# Patient Record
Sex: FEMALE | Race: WHITE | Employment: FULL TIME | URBAN - METROPOLITAN AREA
[De-identification: names, ages, dates, MRNs, and addresses within clinical notes are randomized per-mention and may not be internally consistent; named-entity substitution may affect disease eponyms.]

---

## 2016-10-05 LAB
ANTIBODY SCREEN, EXTERNAL: NEGATIVE
CHLAMYDIA, EXTERNAL: NEGATIVE
HBSAG, EXTERNAL: NEGATIVE
HIV, EXTERNAL: NEGATIVE
N. GONORRHEA, EXTERNAL: NEGATIVE
RPR, EXTERNAL: NEGATIVE
RUBELLA, EXTERNAL: NORMAL
TYPE, ABO & RH, EXTERNAL: NORMAL

## 2017-04-21 LAB — GRBS, EXTERNAL: NEGATIVE

## 2017-05-12 ENCOUNTER — HOSPITAL ENCOUNTER (INPATIENT)
Age: 32
LOS: 7 days | Discharge: HOME | End: 2017-05-19
Attending: OBSTETRICS & GYNECOLOGY | Admitting: OBSTETRICS & GYNECOLOGY
Payer: COMMERCIAL

## 2017-05-12 ENCOUNTER — ANESTHESIA (OUTPATIENT)
Dept: LABOR AND DELIVERY | Age: 32
End: 2017-05-12
Payer: COMMERCIAL

## 2017-05-12 ENCOUNTER — ANESTHESIA EVENT (OUTPATIENT)
Dept: LABOR AND DELIVERY | Age: 32
End: 2017-05-12
Payer: COMMERCIAL

## 2017-05-12 PROBLEM — Z33.1 IUP (INTRAUTERINE PREGNANCY), INCIDENTAL: Status: ACTIVE | Noted: 2017-05-12

## 2017-05-12 LAB
ABO + RH BLD: NORMAL
BASOPHILS # BLD AUTO: 0 K/UL (ref 0–0.06)
BASOPHILS # BLD: 0 % (ref 0–2)
BLOOD GROUP ANTIBODIES SERPL: NORMAL
DIFFERENTIAL METHOD BLD: ABNORMAL
EOSINOPHIL # BLD: 0 K/UL (ref 0–0.4)
EOSINOPHIL NFR BLD: 0 % (ref 0–5)
ERYTHROCYTE [DISTWIDTH] IN BLOOD BY AUTOMATED COUNT: 14.2 % (ref 11.6–14.5)
HCT VFR BLD AUTO: 35.4 % (ref 35–45)
HGB BLD-MCNC: 12.1 G/DL (ref 12–16)
LYMPHOCYTES # BLD AUTO: 11 % (ref 21–52)
LYMPHOCYTES # BLD: 1.9 K/UL (ref 0.9–3.6)
MCH RBC QN AUTO: 28.7 PG (ref 24–34)
MCHC RBC AUTO-ENTMCNC: 34.2 G/DL (ref 31–37)
MCV RBC AUTO: 84.1 FL (ref 74–97)
MONOCYTES # BLD: 0.8 K/UL (ref 0.05–1.2)
MONOCYTES NFR BLD AUTO: 4 % (ref 3–10)
NEUTS SEG # BLD: 14.6 K/UL (ref 1.8–8)
NEUTS SEG NFR BLD AUTO: 85 % (ref 40–73)
PLATELET # BLD AUTO: 237 K/UL (ref 135–420)
PMV BLD AUTO: 10.9 FL (ref 9.2–11.8)
RBC # BLD AUTO: 4.21 M/UL (ref 4.2–5.3)
SPECIMEN EXP DATE BLD: NORMAL
WBC # BLD AUTO: 17.3 K/UL (ref 4.6–13.2)

## 2017-05-12 PROCEDURE — 77030034849

## 2017-05-12 PROCEDURE — 99282 EMERGENCY DEPT VISIT SF MDM: CPT

## 2017-05-12 PROCEDURE — 74011250636 HC RX REV CODE- 250/636

## 2017-05-12 PROCEDURE — 59025 FETAL NON-STRESS TEST: CPT

## 2017-05-12 PROCEDURE — 86900 BLOOD TYPING SEROLOGIC ABO: CPT | Performed by: OBSTETRICS & GYNECOLOGY

## 2017-05-12 PROCEDURE — 65270000029 HC RM PRIVATE

## 2017-05-12 PROCEDURE — 85025 COMPLETE CBC W/AUTO DIFF WBC: CPT | Performed by: OBSTETRICS & GYNECOLOGY

## 2017-05-12 PROCEDURE — 36415 COLL VENOUS BLD VENIPUNCTURE: CPT | Performed by: OBSTETRICS & GYNECOLOGY

## 2017-05-12 PROCEDURE — 74011000250 HC RX REV CODE- 250

## 2017-05-12 PROCEDURE — 77030007879 HC KT SPN EPDRL TELE -B: Performed by: ANESTHESIOLOGY

## 2017-05-12 PROCEDURE — 75410000002 HC LABOR FEE PER 1 HR

## 2017-05-12 PROCEDURE — 76060000078 HC EPIDURAL ANESTHESIA

## 2017-05-12 PROCEDURE — 74011250636 HC RX REV CODE- 250/636: Performed by: ANESTHESIOLOGY

## 2017-05-12 PROCEDURE — 74011250636 HC RX REV CODE- 250/636: Performed by: OBSTETRICS & GYNECOLOGY

## 2017-05-12 RX ORDER — OXYTOCIN/RINGER'S LACTATE 20/1000 ML
500 PLASTIC BAG, INJECTION (ML) INTRAVENOUS ONCE
Status: COMPLETED | OUTPATIENT
Start: 2017-05-12 | End: 2017-05-13

## 2017-05-12 RX ORDER — METHYLERGONOVINE MALEATE 0.2 MG/ML
0.2 INJECTION INTRAVENOUS AS NEEDED
Status: DISCONTINUED | OUTPATIENT
Start: 2017-05-12 | End: 2017-05-13

## 2017-05-12 RX ORDER — FENTANYL CITRATE 50 UG/ML
INJECTION, SOLUTION INTRAMUSCULAR; INTRAVENOUS
Status: DISCONTINUED
Start: 2017-05-12 | End: 2017-05-13

## 2017-05-12 RX ORDER — FENTANYL/ROPIVACAINE/NS/PF 2MCG/ML-.1
PLASTIC BAG, INJECTION (ML) EPIDURAL
Status: COMPLETED
Start: 2017-05-12 | End: 2017-05-12

## 2017-05-12 RX ORDER — HYDROMORPHONE HYDROCHLORIDE 1 MG/ML
1 INJECTION, SOLUTION INTRAMUSCULAR; INTRAVENOUS; SUBCUTANEOUS
Status: DISCONTINUED | OUTPATIENT
Start: 2017-05-12 | End: 2017-05-13

## 2017-05-12 RX ORDER — BUTORPHANOL TARTRATE 2 MG/ML
2 INJECTION INTRAMUSCULAR; INTRAVENOUS
Status: DISCONTINUED | OUTPATIENT
Start: 2017-05-12 | End: 2017-05-13

## 2017-05-12 RX ORDER — SODIUM CHLORIDE 0.9 % (FLUSH) 0.9 %
5-10 SYRINGE (ML) INJECTION AS NEEDED
Status: DISCONTINUED | OUTPATIENT
Start: 2017-05-12 | End: 2017-05-13

## 2017-05-12 RX ORDER — FENTANYL CITRATE 50 UG/ML
INJECTION, SOLUTION INTRAMUSCULAR; INTRAVENOUS AS NEEDED
Status: DISCONTINUED | OUTPATIENT
Start: 2017-05-12 | End: 2017-05-13 | Stop reason: HOSPADM

## 2017-05-12 RX ORDER — LIDOCAINE HYDROCHLORIDE 10 MG/ML
INJECTION INFILTRATION; PERINEURAL
Status: DISCONTINUED
Start: 2017-05-12 | End: 2017-05-13

## 2017-05-12 RX ORDER — NALOXONE HYDROCHLORIDE 0.4 MG/ML
0.2 INJECTION, SOLUTION INTRAMUSCULAR; INTRAVENOUS; SUBCUTANEOUS AS NEEDED
Status: DISCONTINUED | OUTPATIENT
Start: 2017-05-12 | End: 2017-05-13

## 2017-05-12 RX ORDER — SODIUM CHLORIDE, SODIUM LACTATE, POTASSIUM CHLORIDE, CALCIUM CHLORIDE 600; 310; 30; 20 MG/100ML; MG/100ML; MG/100ML; MG/100ML
125 INJECTION, SOLUTION INTRAVENOUS CONTINUOUS
Status: DISCONTINUED | OUTPATIENT
Start: 2017-05-12 | End: 2017-05-13

## 2017-05-12 RX ORDER — FENTANYL/ROPIVACAINE/NS/PF 2MCG/ML-.1
12 PLASTIC BAG, INJECTION (ML) EPIDURAL
Status: DISCONTINUED | OUTPATIENT
Start: 2017-05-12 | End: 2017-05-13

## 2017-05-12 RX ORDER — OXYTOCIN/RINGER'S LACTATE 20/1000 ML
125 PLASTIC BAG, INJECTION (ML) INTRAVENOUS CONTINUOUS
Status: DISCONTINUED | OUTPATIENT
Start: 2017-05-12 | End: 2017-05-13

## 2017-05-12 RX ORDER — LIDOCAINE HYDROCHLORIDE 10 MG/ML
20 INJECTION, SOLUTION EPIDURAL; INFILTRATION; INTRACAUDAL; PERINEURAL AS NEEDED
Status: DISCONTINUED | OUTPATIENT
Start: 2017-05-12 | End: 2017-05-13

## 2017-05-12 RX ORDER — SODIUM CHLORIDE 0.9 % (FLUSH) 0.9 %
5-10 SYRINGE (ML) INJECTION EVERY 8 HOURS
Status: DISCONTINUED | OUTPATIENT
Start: 2017-05-12 | End: 2017-05-13

## 2017-05-12 RX ORDER — FENTANYL CITRATE 50 UG/ML
100 INJECTION, SOLUTION INTRAMUSCULAR; INTRAVENOUS ONCE
Status: DISCONTINUED | OUTPATIENT
Start: 2017-05-12 | End: 2017-05-13

## 2017-05-12 RX ORDER — TERBUTALINE SULFATE 1 MG/ML
0.25 INJECTION SUBCUTANEOUS
Status: DISCONTINUED | OUTPATIENT
Start: 2017-05-12 | End: 2017-05-13

## 2017-05-12 RX ORDER — NALBUPHINE HYDROCHLORIDE 10 MG/ML
10 INJECTION, SOLUTION INTRAMUSCULAR; INTRAVENOUS; SUBCUTANEOUS
Status: DISCONTINUED | OUTPATIENT
Start: 2017-05-12 | End: 2017-05-13

## 2017-05-12 RX ORDER — LIDOCAINE HYDROCHLORIDE AND EPINEPHRINE 15; 5 MG/ML; UG/ML
INJECTION, SOLUTION EPIDURAL AS NEEDED
Status: DISCONTINUED | OUTPATIENT
Start: 2017-05-12 | End: 2017-05-13 | Stop reason: HOSPADM

## 2017-05-12 RX ORDER — MINERAL OIL
30 OIL (ML) ORAL AS NEEDED
Status: COMPLETED | OUTPATIENT
Start: 2017-05-12 | End: 2017-05-13

## 2017-05-12 RX ORDER — DIPHENHYDRAMINE HYDROCHLORIDE 50 MG/ML
12.5 INJECTION, SOLUTION INTRAMUSCULAR; INTRAVENOUS
Status: DISCONTINUED | OUTPATIENT
Start: 2017-05-12 | End: 2017-05-13

## 2017-05-12 RX ORDER — LIDOCAINE HYDROCHLORIDE 10 MG/ML
INJECTION INFILTRATION; PERINEURAL AS NEEDED
Status: DISCONTINUED | OUTPATIENT
Start: 2017-05-12 | End: 2017-05-13 | Stop reason: HOSPADM

## 2017-05-12 RX ORDER — ONDANSETRON 2 MG/ML
4 INJECTION INTRAMUSCULAR; INTRAVENOUS
Status: DISCONTINUED | OUTPATIENT
Start: 2017-05-12 | End: 2017-05-13

## 2017-05-12 RX ADMIN — LIDOCAINE HYDROCHLORIDE AND EPINEPHRINE 3 ML: 15; 5 INJECTION, SOLUTION EPIDURAL at 21:25

## 2017-05-12 RX ADMIN — SODIUM CHLORIDE, SODIUM LACTATE, POTASSIUM CHLORIDE, AND CALCIUM CHLORIDE 125 ML/HR: 600; 310; 30; 20 INJECTION, SOLUTION INTRAVENOUS at 19:17

## 2017-05-12 RX ADMIN — FENTANYL CITRATE 100 MCG: 50 INJECTION, SOLUTION INTRAMUSCULAR; INTRAVENOUS at 21:26

## 2017-05-12 RX ADMIN — SODIUM CHLORIDE, SODIUM LACTATE, POTASSIUM CHLORIDE, AND CALCIUM CHLORIDE 1000 ML: 600; 310; 30; 20 INJECTION, SOLUTION INTRAVENOUS at 21:20

## 2017-05-12 RX ADMIN — LIDOCAINE HYDROCHLORIDE 5 ML: 10 INJECTION INFILTRATION; PERINEURAL at 21:21

## 2017-05-12 RX ADMIN — Medication 12 ML/HR: at 21:40

## 2017-05-12 NOTE — IP AVS SNAPSHOT
[address] [address] 
[phone] Patient: Hiwot Finn MRN: [id number] :1985 You are allergic to the following No active allergies Recent Documentation Height Weight Breastfeeding? BMI OB Status Smoking Status 1.702 m 78 kg Yes 26.94 kg/m2 Recent pregnancy Never Smoker Unresulted Labs Order Current Status CRP, HIGH SENSITIVITY In process CULTURE, BLOOD Preliminary result CULTURE, BLOOD Preliminary result CULTURE, BLOOD Preliminary result CULTURE, BLOOD Preliminary result Emergency Contacts Name Discharge Info Relation Home Work Mobile Steffany Jimenez  Parent [1] [phone] About your hospitalization You were admitted on:  May 12, 2017 You last received care in the:  [address] [place] [address] You were discharged on:  May 19, 2017 Unit phone number:  [phone] Why you were hospitalized Your primary diagnosis was:  Iup (Intrauterine Pregnancy), Incidental  
 Your diagnoses also included:  Normal Spontaneous Vaginal Delivery, Laceration Of Labial Mucosa Without Complication, Anemia Due To Acute Blood Loss, Dyspnea, Sepsis (Hcc), Septic Shock (Hcc), Fever, Endometritis Following Delivery, Hypomagnesemia Providers Seen During Your Hospitalizations Provider Role Specialty Primary office phone Fabby Vaca MD Attending Provider Obstetrics & Gynecology [phone] Your Primary Care Physician (PCP) Primary Care Physician Office Phone Office Fax Juhi Rojas [phone] Follow-up Information Follow up With Details Comments Contact Info Husam Diaz MD   [facility] [address] [facility] 
[phone] Fabby Vaca MD In 2 weeks  0 [facility] [address] [facility] 
[phone] Current Discharge Medication List  
  
 START taking these medications Dose & Instructions Dispensing Information Comments Morning Noon Evening Bedtime  
 amoxicillin-clavulanate 875-125 mg per tablet Commonly known as:  AUGMENTIN Your last dose was: Your next dose is:    
   
   
 Dose:  1 Tab Take 1 Tab by mouth every twelve (12) hours. Quantity:  20 Tab Refills:  0  
     
   
   
   
  
 ibuprofen 800 mg tablet Commonly known as:  MOTRIN Your last dose was: Your next dose is:    
   
   
 Dose:  800 mg Take 1 Tab by mouth every eight (8) hours as needed. Quantity:  60 Tab Refills:  1  
     
   
   
   
  
 oxyCODONE-acetaminophen 5-325 mg per tablet Commonly known as:  PERCOCET Your last dose was: Your next dose is:    
   
   
 Dose:  1-2 Tab Take 1-2 Tabs by mouth every four (4) hours as needed. Max Daily Amount: 12 Tabs. Quantity:  30 Tab Refills:  0 Saccharomyces boulardii 250 mg capsule Commonly known as:  Aakash Dixon Your last dose was: Your next dose is:    
   
   
 Dose:  500 mg Take 2 Caps by mouth two (2) times a day for 7 days. Quantity:  28 Cap Refills:  0 CONTINUE these medications which have NOT CHANGED Dose & Instructions Dispensing Information Comments Morning Noon Evening Bedtime  
 prenatal vit-iron fumarate-fa 27-0.8 mg Tab tablet Your last dose was: Your next dose is:    
   
   
 Dose:  1 Tab Take 1 Tab by mouth daily. Refills:  0 Where to Get Your Medications Information on where to get these meds will be given to you by the nurse or doctor. ! Ask your nurse or doctor about these medications  
  amoxicillin-clavulanate 875-125 mg per tablet  
 ibuprofen 800 mg tablet  
 oxyCODONE-acetaminophen 5-325 mg per tablet Saccharomyces boulardii 250 mg capsule Discharge Instructions POST DELIVERY DISCHARGE INSTRUCTIONS Name: Hector Martinez YOB: 1985 Primary Diagnosis: Active Problems: 
  Normal spontaneous vaginal delivery (2017) Laceration of labial mucosa without complication (1627) Anemia due to acute blood loss (2017) Dyspnea (2017) Sepsis ([facility] [address] (2017) Septic shock ([address] (2017) Fever (2017) Endometritis following delivery (2017) Hypomagnesemia (5/15/2017) General:  
 
Diet/Diet Restrictions: 
Eight 8-ounce glasses of fluid daily (water, juices); avoid excessive caffeine intake. Meals/snacks as desired which are high in fiber and carbohydrates and low in fat and cholesterol. Physical Activity / Restrictions / Safety:  
 
Avoid heavy lifting, no more that 8 lbs. For 2-3 weeks. Avoid intercourse 4-6 weeks, no douching or tampon use. Check with obstetrician before starting or resuming an exercise program.    
 
 
Discharge Instructions/Special Treatment/Home Care Needs:  
 
Continue prenatal vitamins. Continue to use squirt bottle with warm water on your episiotomy after each bathroom use until bleeding stops. Call your doctor for the following:  
 
Fever over 101 degrees by mouth. Vaginal bleeding heavier than a normal menstrual period or clot larger than a golf ball. Red streaks or increased swelling of legs, painful red streaks on your breast. 
Painful urination, constipation and increased pain or swelling or discharge with your incision. If you feel extremely anxious or overwhelmed. If you have thoughts of harming yourself and/or your baby. Pain Management:  
 
Pain Management:  
Take Acetaminophen (Tylenol) or Ibuprofen (Advil, Motrin), as directed for pain. Use a warm Sitz bath 3 times daily to relieve episiotomy or hemorrhoidal discomfort. Heating pad to  incision as needed.  For hemorrhoidal discomfort, use Tucks and Anusol cream as needed and directed. Follow-Up Care: These are general instructions for a healthy lifestyle: No smoking/ No tobacco products/ Avoid exposure to second hand smoke Surgeon General's Warning:  Quitting smoking now greatly reduces serious risk to your health. Obesity, smoking, and sedentary lifestyle greatly increases your risk for illness A healthy diet, regular physical exercise & weight monitoring are important for maintaining a healthy lifestyle Recognize signs and symptoms of STROKE: 
 
F-face looks uneven A-arms unable to move or move unevenly S-speech slurred or non-existent T-time-call 911 as soon as signs and symptoms begin-DO NOT go Back to bed or wait to see if you get better-TIME IS BRAIN. Signed By: Sandra Villavicencio LPN                                                                                                   Date: 5/19/2017 Time: 5:11 PM 
 
Patient {[facility]:12714} Discharge Instructions Attachments/References PREGNANCY: VAGINAL DELIVERY (ENGLISH) BREASTFEEDING (ENGLISH) BREASTFEEDING MOTHERS: NUTRITION (ENGLISH) Discharge Orders Procedure Order Date Status Priority Quantity Spec Type Associated Dx DISCHARGE INSTRUCTIONS 05/19/17 1651 Normal Routine 1 Schedule Instructions: Follow-up one week in office L[employer]ster Announcement We are excited to announce that we are making your provider's discharge notes available to you in Lobster. You will see these notes when they are completed and signed by the physician that discharged you from your recent hospital stay. If you have any questions or concerns about any information you see in Lobster, please call the Health Information Department where you were seen or reach out to your Primary Care Provider for more information about your plan of care. Introducing [facility]!    
 Gus Larson introduces L[employer] patient portal. Now you can access parts of your medical record, email your doctor's office, and request medication refills online. 1. In your internet browser, go to [email or web address] 2. Click on the First Time User? Click Here link in the Sign In box. You will see the New Member Sign Up page. 3. Enter your [employer] Access Code exactly as it appears below. You will not need to use this code after youve completed the sign-up process. If you do not sign up before the expiration date, you must request a new code. · [employer] Access Code: [email or web address] Expires: 8/16/2017  2:06 PM 
 
4. Enter the last four digits of your Social Security Number (xxxx) and Date of Birth (mm/dd/yyyy) as indicated and click Submit. You will be taken to the next sign-up page. 5. Create a [employer] ID. This will be your [employer] login ID and cannot be changed, so think of one that is secure and easy to remember. 6. Create a [employer] password. You can change your password at any time. 7. Enter your Password Reset Question and Answer. This can be used at a later time if you forget your password. 8. Enter your e-mail address. You will receive e-mail notification when new information is available in [address] 9. Click Sign Up. You can now view and download portions of your medical record. 10. Click the Download Summary menu link to download a portable copy of your medical information. If you have questions, please visit the Frequently Asked Questions section of the [employer] website. Remember, [employer] is NOT to be used for urgent needs. For medical emergencies, dial 911. Now available from your iPhone and Android! General Information Please provide this summary of care documentation to your next provider. Patient Signature:  ____________________________________________________________ Date:  ____________________________________________________________  
  
Theone Madrigal  Provider Signature: ____________________________________________________________ Date:  ____________________________________________________________ More Information Vaginal Childbirth: Care Instructions Your Care Instructions Your body will slowly heal in the next few weeks. It is easy to get too tired and overwhelmed during the first weeks after your baby is born. Changes in your hormones can shift your mood without warning. You may find it hard to meet the extra demands on your energy and time. Take it easy on yourself. Follow-up care is a key part of your treatment and safety. Be sure to make and go to all appointments, and call your doctor if you are having problems. It's also a good idea to know your test results and keep a list of the medicines you take. How can you care for yourself at home? · Vaginal bleeding and cramps ¨ After delivery, you will have a bloody discharge from the vagina. This will turn pink within a week and then white or yellow after about 10 days. It may last for 2 to 4 weeks or longer, until the uterus has healed. Use pads instead of tampons until you stop bleeding. ¨ Do not worry if you pass some blood clots, as long as they are smaller than a golf ball. If you have a tear or stitches in your vaginal area, change the pad at least every 4 hours to prevent soreness and infection. ¨ You may have cramps for the first few days after childbirth. These are normal and occur as the uterus shrinks to normal size. Take an over-the-counter pain medicine, such as acetaminophen (Tylenol), ibuprofen (Advil, Motrin), or naproxen (Aleve), for cramps. Read and follow all instructions on the label. Do not take aspirin, because it can cause more bleeding. ¨ Do not take two or more pain medicines at the same time unless the doctor told you to. Many pain medicines have acetaminophen, which is Tylenol. Too much acetaminophen (Tylenol) can be harmful. · Stitches ¨ If you have stitches, they will dissolve on their own and do not need to be removed. Follow your doctor's instructions for cleaning the stitched area. ¨ Put ice or a cold pack on your painful area for 10 to 20 minutes at a time, several times a day, for the first few days. Put a thin cloth between the ice and your skin. ¨ Sit in a few inches of warm water (sitz bath) 3 times a day and after bowel movements. The warm water helps with pain and itching. If you do not have a tub, a warm shower might help. · Breast fullness ¨ Your breasts may overfill (engorge) in the first few days after delivery. To help milk flow and to relieve pain, warm your breasts in the shower or by using warm, moist towels before nursing. ¨ If you are not nursing, do not put warmth on your breasts or touch your breasts. Wear a tight bra or sports bra and use ice until the fullness goes away. This usually takes 2 to 3 days. ¨ Put ice or a cold pack on your breast after nursing to reduce swelling and pain. Put a thin cloth between the ice and your skin. · Activity ¨ Eat a balanced diet. Do not try to lose weight by cutting calories. Keep taking your prenatal vitamins, or take a multivitamin. ¨ Get as much rest as you can. Try to take naps when your baby sleeps during the day. ¨ Get some exercise every day. But do not do any heavy exercise until your doctor says it is okay. ¨ Wait until you are healed (about 4 to 6 weeks) before you have sexual intercourse. Your doctor will tell you when it is okay to have sex. ¨ Talk to your doctor about birth control. You can get pregnant even before your period returns. Also, you can get pregnant while you are breastfeeding. · Mental health ¨ It is normal to have some sadness, anxiety, sleeplessness, and mood swings after you go home. If you feel upset or hopeless for more than a few days or are having trouble doing the things you need to do, talk to your doctor. · Constipation and hemorrhoids ¨ Drink plenty of fluids, enough so that your urine is light yellow or clear like water. If you have kidney, heart, or liver disease and have to limit fluids, talk with your doctor before you increase the amount of fluids you drink. ¨ Eat plenty of fiber each day. Have a bran muffin or bran cereal for breakfast, and try eating a piece of fruit for a mid-afternoon snack. ¨ For painful, itchy hemorrhoids, put ice or a cold pack on the area several times a day for 10 minutes at a time. Follow this by putting a warm compress on the area for another 10 to 20 minutes or by sitting in a shallow, warm bath. When should you call for help? Call 911 anytime you think you may need emergency care. For example, call if: 
· You are thinking of hurting yourself, your baby, or anyone else. · You have sudden, severe pain in your belly. · You passed out (lost consciousness). Call your doctor now or seek immediate medical care if: 
· You have severe vaginal bleeding. · You are soaking through a pad each hour for 2 or more hours. · Your vaginal bleeding seems to be getting heavier or is still bright red 4 days after delivery. · You are dizzy or lightheaded, or you feel like you may faint. · You are vomiting or cannot keep fluids down. · You have a fever. · You have new or more belly pain. · You pass tissue (not just blood). · Your vaginal discharge smells bad. · Your belly feels tender or full and hard. · Your breasts are continuously painful or red. · You feel sad, anxious, or hopeless for more than a few days. Watch closely for changes in your health, and be sure to contact your doctor if you have any problems. Where can you learn more? Go to http://angel-dariela.info/. Enter C103 in the search box to learn more about \"Vaginal Childbirth: Care Instructions. \" Current as of: May 30, 2016 Content Version: 11.2 © 8222-6384 [employer].  Care instructions adapted under license by [address] (which disclaims liability or warranty for this information). If you have questions about a medical condition or this instruction, always ask your healthcare professional. [address] any warranty or liability for your use of this information. Breastfeeding: Care Instructions Your Care Instructions Breastfeeding has many benefits. It may lower your baby's chances of getting an infection. It also may prevent your baby from having problems such as diabetes and high cholesterol later in life. Breastfeeding also helps you bond with your baby. The American Academy of Pediatrics recommends breastfeeding for at least a year. That may be very hard for many women to do, but breastfeeding even for a shorter period of time is a health benefit to you and your baby. In the first days after birth, your breasts make a thick, yellow liquid called colostrum. This liquid gives your baby nutrients and antibodies against infection. It is all that babies need in the first days after birth. Your breasts will fill with milk a few days after the birth. Breastfeeding is a skill that gets better with practice. It is common to have some problems. Some women have sore or cracked nipples, blocked milk ducts, or a breast infection (mastitis). But if you feed your baby every 1 to 2 hours during the day and follow the tips on this sheet, you may not have these problems. You can treat these problems if they happen and continue breastfeeding. Follow-up care is a key part of your treatment and safety. Be sure to make and go to all appointments, and call your doctor if you are having problems. It's also a good idea to know your test results and keep a list of the medicines you take. How can you care for yourself at home? · Breastfeed your baby whenever he or she is hungry. In the first 2 weeks, your baby will feed about every 1 to 3 hours.  This will help you keep up your supply of milk. · Put a bed pillow or a nursing pillow on your lap to support your arms and your baby. · Hold your baby in a comfortable position. ¨ You can hold your baby in several ways. One of the most common positions is the cradle hold. One arm supports your baby, with his or her head in the bend of your elbow. Your open hand supports your baby's bottom or back. Your baby's belly lies against yours. ¨ If you had your baby by , or , try the football hold. This position keeps your baby off your belly. Tuck your baby under your arm, with his or her body along the side you will be feeding on. Support your baby's upper body with your arm. With that hand you can control your baby's head to bring his or her mouth to your breast. 
¨ Try different positions with each feeding. If you are having problems, ask for help from your doctor or a lactation consultant. · To get your baby to latch on: 
¨ Support and narrow your breast with one hand using a \"U hold,\" with your thumb on the outer side of your breast and your fingers on the inner side. You can also use a \"C hold,\" with all your fingers below the nipple and your thumb above it. Try the different holds to get the deepest latch for whichever breastfeeding position you use. Your other arm is behind your baby's back, with your hand supporting the base of the baby's head. Position your fingers and thumb to point toward your baby's ears. ¨ You can touch your baby's lower lip with your nipple to get your baby to open his or her mouth. Wait until your baby opens up really wide, like a big yawn. Then be sure to bring the baby quickly to your breastnot your breast to the baby. As you bring your baby toward your breast, use your other hand to support the breast and guide it into his or her mouth. ¨ Both the nipple and a large portion of the darker area around the nipple (areola) should be in the baby's mouth.  The baby's lips should be flared outward, not folded in (inverted). ¨ Listen for a regular sucking and swallowing pattern while the baby is feeding. If you cannot see or hear a swallowing pattern, watch the baby's ears, which will wiggle slightly when the baby swallows. If the baby's nose appears to be blocked by your breast, tilt the baby's head back slightly, so just the edge of one nostril is clear for breathing. ¨ When your baby is latched, you can usually remove your hand from supporting your breast and bring it under your baby to cradle him or her. Now just relax and breastfeed your baby. · You will know that your baby is feeding well when: 
¨ His or her mouth covers a lot of the areola, and the lips are flared out. ¨ His or her chin and nose rest against your breast. 
¨ Sucking is deep and rhythmic, with short pauses. ¨ You are able to see and hear your baby swallowing. ¨ You do not feel pain in your nipple. · If your baby takes only one breast at a feeding, start the next feeding on the other breast. 
· Anytime you need to remove your baby from the breast, put one finger in the corner of his or her mouth. Push your finger between your baby's gums to gently break the seal. If you do not break the tight seal before you remove your baby, your nipples can become sore, cracked, or bruised. · After feeding your baby, gently pat his or her back to let out any swallowed air. After your baby burps, offer the breast again, or offer the other breast. Sometimes a baby will want to keep feeding after being burped. When should you call for help? Call your doctor now or seek immediate medical care if: 
· You have symptoms of a breast infection, such as: 
¨ Increased pain, swelling, redness, or warmth around a breast. 
¨ Red streaks extending from the breast. 
¨ Pus draining from a breast. 
¨ A fever. · Your baby has no wet diapers for 6 hours. Watch closely for changes in your health, and be sure to contact your doctor if: · Your baby has trouble latching on to your breast. 
· You continue to have pain or discomfort when breastfeeding. · You have other questions or concerns. Where can you learn more? Go to http://angel-dariela.info/. Enter P492 in the search box to learn more about \"Breastfeeding: Care Instructions. \" Current as of: May 30, 2016 Content Version: 11.2 © 5409-2777 [employer]. Care instructions adapted under license by [employer] (which disclaims liability or warranty for this information). If you have questions about a medical condition or this instruction, always ask your healthcare professional. [place] [address] any warranty or liability for your use of this information. Nutrition for Breastfeeding Mothers: Care Instructions Your Care Instructions When a woman breastfeeds her baby, she needs more nutrients to keep herself healthy and to make the baby's milk. Breastfeeding helps build the bond between you and your baby. It gives your baby excellent health benefits. A healthy diet includes eating a variety of foods from the basic food groups: grains, vegetables, fruits, milk and milk products (such as cheese and yogurt), and meat and dried beans. Eating well during breastfeeding will ensure that you stay healthy and your baby grows and develops normally. Follow-up care is a key part of your treatment and safety. Be sure to make and go to all appointments, and call your doctor if you are having problems. It's also a good idea to know your test results and keep a list of the medicines you take. How can you care for yourself at home? · Include 3 to 4 cups of nonfat or low-fat milk or milk products in your diet every day. These include: ¨ Milk (8 ounces equals 1 cup). ¨ Ice cream (1½ cups equals 1 cup of milk). ¨ Cheese (1½ ounces of cheese equals 1 cup). ¨ Yogurt (8 ounces equals 1 cup). · Eat at least 7 ounces of grains, such as cereals, breads, crackers, rice, or pasta, every day. One ounce is about 1 slice of bread, 1 cup of breakfast cereal, or ½ cup of cooked rice, cereal, or pasta. · Eat 3 cups of vegetables each day. Choices include: ¨ Dark-green vegetables such as broccoli and spinach. ¨ Orange vegetables such as carrots and sweet potatoes. ¨ Dried beans (such as francisco and kidney beans) and peas (such as lentils). · Every day, eat 2 cups of fresh, frozen, or canned fruit. · Eat 6½ ounces each day of protein, such as chicken, fish, lean meat, eggs, peanut butter, dried beans and peas, nuts, and seeds. One egg, 1 tablespoon of peanut butter, or ½ ounce of nuts or seeds equals 1 ounce of protein. A ½ cup of cooked beans equals 2 ounces of protein. · Drink plenty of fluids, enough so that your urine is light yellow or clear like water. If you have kidney, heart, or liver disease and have to limit fluids, talk with your doctor before you increase the amount of fluids you drink. · Limit caffeine products, such as coffee, tea, chocolate, and some sodas. Caffeine can pass to your baby through breast milk. It may cause fussiness and sleep problems in babies. · Your doctor may recommend a vitamin supplement. Take it as recommended. · Consider joining a breastfeeding support group. These are offered at many hospitals and birthing centers by nurses, nurse-midwives, or lactation consultants. When should you call for help? Watch closely for changes in your health, and be sure to contact your doctor if: 
· You feel that you are not making enough milk for your baby. · You are losing a lot of weight. · You do not think your baby is gaining enough weight. · You would like help to plan a healthy diet. Where can you learn more? Go to http://angel-dariela.info/. Enter P234 in the search box to learn more about \"Nutrition for Breastfeeding Mothers: Care Instructions. \" 
 Current as of: May 30, 2016 Content Version: 11.2 © 1952-8934 [employer]. Care instructions adapted under license by [employer] (which disclaims liability or warranty for this information). If you have questions about a medical condition or this instruction, always ask your healthcare professional. [address] any warranty or liability for your use of this information.

## 2017-05-12 NOTE — PROGRESS NOTES
1830 Pt arrived with complaints of contractions sinced 1500.    1911 SBAR report to Dr Marisol Catalan re: g/p, edc, ve, contraction pattern, gbs positive. Orders received to admit pt.    1919 Bedside and Verbal shift change report given to Boni Lyn (oncoming nurse) by DOMINGO Gu RN (offgoing nurse). Report included the following information SBAR, Kardex, ED Summary, OR Summary, Procedure Summary, Intake/Output, MAR, Recent Results and Med Rec Status.

## 2017-05-13 PROBLEM — Z33.1 IUP (INTRAUTERINE PREGNANCY), INCIDENTAL: Status: RESOLVED | Noted: 2017-05-12 | Resolved: 2017-05-13

## 2017-05-13 PROBLEM — S01.512A LACERATION OF LABIAL MUCOSA WITHOUT COMPLICATION: Status: ACTIVE | Noted: 2017-05-13

## 2017-05-13 PROCEDURE — 65270000029 HC RM PRIVATE

## 2017-05-13 PROCEDURE — 10907ZC DRAINAGE OF AMNIOTIC FLUID, THERAPEUTIC FROM PRODUCTS OF CONCEPTION, VIA NATURAL OR ARTIFICIAL OPENING: ICD-10-PCS | Performed by: OBSTETRICS & GYNECOLOGY

## 2017-05-13 PROCEDURE — 74011250637 HC RX REV CODE- 250/637: Performed by: OBSTETRICS & GYNECOLOGY

## 2017-05-13 PROCEDURE — 74011250636 HC RX REV CODE- 250/636: Performed by: ANESTHESIOLOGY

## 2017-05-13 PROCEDURE — 76060000078 HC EPIDURAL ANESTHESIA

## 2017-05-13 PROCEDURE — 75410000003 HC RECOV DEL/VAG/CSECN EA 0.5 HR

## 2017-05-13 PROCEDURE — 74011250636 HC RX REV CODE- 250/636: Performed by: OBSTETRICS & GYNECOLOGY

## 2017-05-13 PROCEDURE — 75410000000 HC DELIVERY VAGINAL/SINGLE

## 2017-05-13 PROCEDURE — 0UQMXZZ REPAIR VULVA, EXTERNAL APPROACH: ICD-10-PCS | Performed by: OBSTETRICS & GYNECOLOGY

## 2017-05-13 PROCEDURE — 75410000002 HC LABOR FEE PER 1 HR

## 2017-05-13 RX ORDER — OXYCODONE AND ACETAMINOPHEN 5; 325 MG/1; MG/1
2 TABLET ORAL
Status: DISCONTINUED | OUTPATIENT
Start: 2017-05-13 | End: 2017-05-14

## 2017-05-13 RX ORDER — PROMETHAZINE HYDROCHLORIDE 25 MG/ML
25 INJECTION, SOLUTION INTRAMUSCULAR; INTRAVENOUS
Status: DISCONTINUED | OUTPATIENT
Start: 2017-05-13 | End: 2017-05-16

## 2017-05-13 RX ORDER — ACETAMINOPHEN 325 MG/1
650 TABLET ORAL
Status: DISCONTINUED | OUTPATIENT
Start: 2017-05-13 | End: 2017-05-14

## 2017-05-13 RX ORDER — ZOLPIDEM TARTRATE 5 MG/1
5 TABLET ORAL
Status: DISCONTINUED | OUTPATIENT
Start: 2017-05-13 | End: 2017-05-19 | Stop reason: HOSPADM

## 2017-05-13 RX ORDER — IBUPROFEN 400 MG/1
800 TABLET ORAL
Status: DISCONTINUED | OUTPATIENT
Start: 2017-05-13 | End: 2017-05-19 | Stop reason: HOSPADM

## 2017-05-13 RX ORDER — AMOXICILLIN 250 MG
1 CAPSULE ORAL
Status: DISCONTINUED | OUTPATIENT
Start: 2017-05-13 | End: 2017-05-19 | Stop reason: HOSPADM

## 2017-05-13 RX ADMIN — Medication 12 ML/HR: at 04:06

## 2017-05-13 RX ADMIN — Medication 1 TABLET: at 11:44

## 2017-05-13 RX ADMIN — Medication 30 ML: at 08:00

## 2017-05-13 RX ADMIN — Medication 10000 MILLI-UNITS/HR: at 08:14

## 2017-05-13 RX ADMIN — IBUPROFEN 800 MG: 400 TABLET ORAL at 18:05

## 2017-05-13 RX ADMIN — IBUPROFEN 800 MG: 400 TABLET ORAL at 09:02

## 2017-05-13 RX ADMIN — SODIUM CHLORIDE, SODIUM LACTATE, POTASSIUM CHLORIDE, AND CALCIUM CHLORIDE 125 ML/HR: 600; 310; 30; 20 INJECTION, SOLUTION INTRAVENOUS at 02:58

## 2017-05-13 RX ADMIN — OXYCODONE HYDROCHLORIDE AND ACETAMINOPHEN 2 TABLET: 5; 325 TABLET ORAL at 18:16

## 2017-05-13 RX ADMIN — OXYCODONE HYDROCHLORIDE AND ACETAMINOPHEN 2 TABLET: 5; 325 TABLET ORAL at 11:44

## 2017-05-13 NOTE — PROGRESS NOTES
TRANSFER - IN REPORT:    Verbal report received from JACQUELIN Urban RN(name) on [employer]  being received from Labor and Delivery(unit) for routine progression of care      Report consisted of patients Situation, Background, Assessment and   Recommendations(SBAR). Information from the following report(s) SBAR, Kardex and Recent Results was reviewed with the receiving nurse. Opportunity for questions and clarification was provided. Assessment completed upon patients arrival to unit and care assumed.

## 2017-05-13 NOTE — L&D DELIVERY NOTE
Delivery Summary    Patient: Junior Delaney MRN: [id number]  SSN: [id number]    YOB: 1985  Age: 36 y.o. Sex: female        Labor Events:    Labor: No    Rupture Date: 2017    Rupture Time: 7:22 AM    Rupture Type AROM    Amniotic Fluid Volume: Moderate     Amniotic Fluid Description: Clear  None    Induction: None         Augmentation: None    Labor Complications: None     Additional Complications:        Cervical Ripening:       None      Delivery Events:  Episiotomy: None    Laceration(s): Left labial;Right labial       Repaired:       Number of Repair Packets: 1    Suture Type and Size: Chromic 2-0        Estimated Blood Loss (ml): 300        Information for the patient's :  Marlys Ellsworth [[id number]]     Delivery Summary - Baby    Delivery Date: 2017   Delivery Time: 8:09 AM   Delivery Type: Vaginal, Spontaneous Delivery  Sex:  female  Gestational Age: 39w0d  Delivery Clinician:  Griffin Florez  Living?: Yes   Delivery Location: L&D             APGARS  One minute Five minutes Ten minutes   Skin Color: 1    1       Heart Rate: 2   2         Reflex Irritability: 2   2         Muscle Tone: 2   2       Respiration: 2   2         Total: 9   9           Presentation: Vertex  Position: Left Occiput Anterior  Resuscitation Method:  Tactile Stimulation;Suctioning-bulb     Meconium Stained: None    Cord Information: 3 Vessels   Complications: None  Cord Blood Sent?:  Yes    Blood Gases Sent?:  No    Placenta:  Date/Time:   8:18 AM  Removal: Spontaneous      Appearance: Normal      Measurements:  Birth Weight:    pending  Birth Length:     Head Circumference:       Chest Circumference:      Abdominal Girth:       Other Providers:   YING Lira Obstetrician;Primary Nurse;Primary  Nurse           Cord Blood Results:  Information for the patient's :  Marlys Ellsworth [[id number]]   No results found for: [address] Allie Contreras, PCTABR, PCTDIG, BILI, ABORHEXT, ABORH    Information for the patient's :  Margarita Alexsandrampers [[id number]]   No results found for: APH, APCO2, APO2, AHCO3, ABEC, ABDC, O2ST, SITE, RSCOM, PHI, PCO2I, PO2I, HCO3I, SO2I, IBD     Information for the patient's :  Margarita Bumpers [[id number]]   No results found for: EPHV, PCO2V, PO2V, HCO3V, O2STV, EBDV

## 2017-05-13 NOTE — PROGRESS NOTES
Patient ambulated to bathroom and voided into hat. Noted 1000 mL urine with a few small clots. Weighed used pad and measured 37 mL of blood. Patient tolerated well.

## 2017-05-13 NOTE — PROGRESS NOTES
1030: assisted OOB to bathroom. Able to walk and void without difficulty. Pads and gown changed    6403: TRANSFER - OUT REPORT:    Verbal report given to Edison Moyer RN (name) on Ras Keyes  being transferred to [facility] (unit) for routine progression of care       Report consisted of patients Situation, Background, Assessment and   Recommendations(SBAR). Information from the following report(s) SBAR, Kardex, Intake/Output, MAR and Recent Results was reviewed with the receiving nurse. Lines:   Peripheral IV 05/12/17 Left Arm (Active)   Site Assessment Clean, dry, & intact 5/12/2017  8:32 PM   Phlebitis Assessment 0 5/12/2017  8:32 PM   Infiltration Assessment 0 5/12/2017  8:32 PM   Dressing Status Clean, dry, & intact 5/12/2017  8:32 PM   Dressing Type Tape;Transparent 5/12/2017  8:32 PM   Hub Color/Line Status Green; Infusing 5/12/2017  8:32 PM   Alcohol Cap Used Yes 5/12/2017  8:32 PM        Opportunity for questions and clarification was provided.       Patient transported with:   Registered Nurse   Care surrendered

## 2017-05-13 NOTE — PROGRESS NOTES
0730 Care assumed. 9520  of viable girl. 1045 Bedside and Verbal shift change report given to [id number] [facility] (oncoming nurse) by DOMINGO Gu RN (offgoing nurse). Report included the following information SBAR, Kardex, ED Summary, OR Summary, Procedure Summary, Intake/Output, MAR, Recent Results and Med Rec Status.

## 2017-05-13 NOTE — H&P
History & Physical    Name: Aline Andres MRN: [id number]  SSN: [id number]    YOB: 1985  Age: 36 y.o. Sex: female        Subjective:     Estimated Date of Delivery: 17  OB History    Para Term  AB SAB TAB Ectopic Multiple Living   2    1 1          # Outcome Date GA Lbr Mello/2nd Weight Sex Delivery Anes PTL Lv   2 Current            1 SAB                   Ms. David Rasmussen is admitted with pregnancy at 39w0d for active labor. Prenatal course was normal. Please see prenatal records for details. History reviewed. No pertinent past medical history. History reviewed. No pertinent surgical history. Social History     Occupational History    Not on file. Social History Main Topics    Smoking status: Never Smoker    Smokeless tobacco: Never Used    Alcohol use No    Drug use: No    Sexual activity: Yes     Partners: Male     Family History   Problem Relation Age of Onset    No Known Problems Mother     No Known Problems Father        No Known Allergies  Prior to Admission medications    Medication Sig Start Date End Date Taking? Authorizing Provider   prenatal vit-iron fumarate-fa 27-0.8 mg tab tablet Take 1 Tab by mouth daily. Yes Historical Provider        Review of Systems: A comprehensive review of systems was negative. Objective:     Vitals:  Vitals:    17 0646 17 0657 17 0701 17 0722   BP: 125/79  115/80    Pulse: 86  97    Resp:    20   Temp:    98.3 °F (36.8 °C)   SpO2:  100%  100%   Weight:       Height:            Physical Exam:  Patient without distress. Abdomen:  Gravid, vertex  Pelvci:  Complete/0  Membranes:  Artificial Rupture of Membranes;  Amniotic Fluid: small amount of clear fluid  Fetal Heart Rate: Reactive    Prenatal Labs:   Lab Results   Component Value Date/Time    ABO/Rh(D) A POSITIVE 2017 07:20 PM         Assessment/Plan:     Active Problems:    IUP (intrauterine pregnancy), incidental (2017)         Plan: Admit for Continue plan for vaginal delivery. Group B Strep was negative.     Signed By:  Kaitlyn Hernandez MD     May 13, 2017

## 2017-05-13 NOTE — ANESTHESIA PROCEDURE NOTES
Epidural Block    Start time: 5/12/2017 9:06 PM  End time: 5/12/2017 9:32 PM  Performed by: Mara Cantu  Authorized by: Mara Cantu     Pre-Procedure  Indication: labor epidural    Preanesthetic Checklist: patient identified, risks and benefits discussed, anesthesia consent, site marked, patient being monitored, timeout performed and anesthesia consent    Timeout Time: 21:17        Epidural:   Patient position:  Seated  Prep region:  Lumbar  Prep: Chlorhexidine    Location:  L4-5    Needle and Epidural Catheter:   Needle Type:  Tuohy  Needle Gauge:  17 G  Injection Technique:  Loss of resistance using saline  Attempts:  1  Catheter Size:  19 G  Catheter at Skin Depth (cm):  12  Depth in Epidural Space (cm):  5  Events: no blood with aspiration, no cerebrospinal fluid with aspiration, no paresthesia and negative aspiration test    Test Dose:  Negative and lidocaine 1.5% w/ epi    Assessment:   Catheter Secured:  Tegaderm and tape  Insertion:  Uncomplicated  Patient tolerance:  Patient tolerated the procedure well with no immediate complications  Pt tolerated procedure well. No paresthesia noted during JUANITA or catheter placement. Pts contractions becoming less intense.

## 2017-05-13 NOTE — ANESTHESIA PREPROCEDURE EVALUATION
Anesthetic History   No history of anesthetic complications            Review of Systems / Medical History  Patient summary reviewed, nursing notes reviewed and pertinent labs reviewed    Pulmonary  Within defined limits                 Neuro/Psych   Within defined limits           Cardiovascular                Pertinent negatives: No hypertension  Exercise tolerance: >4 METS     GI/Hepatic/Renal             Pertinent negatives: No liver disease and renal disease   Endo/Other          Pertinent negatives: No diabetes and obesity   Other Findings              Physical Exam    Airway  Mallampati: II  TM Distance: 4 - 6 cm  Neck ROM: normal range of motion   Mouth opening: Normal     Cardiovascular    Rhythm: regular  Rate: normal         Dental  No notable dental hx       Pulmonary  Breath sounds clear to auscultation               Abdominal  GI exam deferred       Other Findings            Anesthetic Plan    ASA: 2  Anesthesia type: epidural            Anesthetic plan and risks discussed with: Patient      Plan labor epidural.  All risks including rare risk of nerve damage discussed. Pt understands and accepts risks and agrees to proceed.

## 2017-05-13 NOTE — PROGRESS NOTES
1915 Bedside and Verbal shift change report given to SAMMI Zamarripa RN (oncoming nurse) by Kali Mane, ISIDRO (offgoing nurse). Report included the following information SBAR and Kardex, I&O, delivery summary, and MAR reviewed. 2000 Pt ambulated to room 6 for admission. 2053 SVE 6/70/-2. Pt requests epidural.  Dr. El Esquivel paged and on his way. 2111 Dr. El Esquivel in room for epidural placement. 2117 Time out completed. 2125 Test dose given. 2126 Fentanyl given to Dr. El Esquivel for administration. 2132 Bolus given by Dr. El Esquivel. 9651 Boateng catheter placed. Pt tolerated well. Pt resting on right side. 0000 SVE 7/80/-2.    0130 Pt resting in bed with eyes closed, respirations even and unlabored, call bell in reach, bed in low position. 2344 SVE 9/100/0. Pt feeling some pelvic pressure. 0630 Dr. Dione Young notified of pt's exam.  States she will be in to see pt.    0715 Bedside and Verbal shift change report given to DENIS Gu RN (oncoming nurse) by Yulisa Lozano RN (offgoing nurse). Report included the following information SBAR and Kardex, I&O, delivery summary, and MAR reviewed.

## 2017-05-13 NOTE — PROGRESS NOTES
Bedside and Verbal shift change report given to Jessa Ojeda RN (oncoming nurse) by SHAYY Romero RN (offgoing nurse). Report included the following information SBAR, Kardex and Recent Results.

## 2017-05-14 ENCOUNTER — APPOINTMENT (OUTPATIENT)
Dept: CT IMAGING | Age: 32
End: 2017-05-14
Attending: OBSTETRICS & GYNECOLOGY
Payer: COMMERCIAL

## 2017-05-14 ENCOUNTER — APPOINTMENT (OUTPATIENT)
Dept: GENERAL RADIOLOGY | Age: 32
End: 2017-05-14
Attending: FAMILY MEDICINE
Payer: COMMERCIAL

## 2017-05-14 PROBLEM — R50.9 FEVER: Status: ACTIVE | Noted: 2017-05-14

## 2017-05-14 PROBLEM — R06.00 DYSPNEA: Status: ACTIVE | Noted: 2017-05-14

## 2017-05-14 PROBLEM — A41.9 SEPTIC SHOCK (HCC): Status: ACTIVE | Noted: 2017-05-14

## 2017-05-14 PROBLEM — R65.21 SEPTIC SHOCK (HCC): Status: ACTIVE | Noted: 2017-05-14

## 2017-05-14 PROBLEM — D62 ANEMIA DUE TO ACUTE BLOOD LOSS: Status: ACTIVE | Noted: 2017-05-14

## 2017-05-14 PROBLEM — A41.9 SEPSIS (HCC): Status: ACTIVE | Noted: 2017-05-14

## 2017-05-14 LAB
ALBUMIN SERPL BCP-MCNC: 1.5 G/DL (ref 3.4–5)
ALBUMIN SERPL BCP-MCNC: 1.9 G/DL (ref 3.4–5)
ALBUMIN/GLOB SERPL: 0.5 {RATIO} (ref 0.8–1.7)
ALBUMIN/GLOB SERPL: 0.6 {RATIO} (ref 0.8–1.7)
ALP SERPL-CCNC: 145 U/L (ref 45–117)
ALP SERPL-CCNC: 88 U/L (ref 45–117)
ALT SERPL-CCNC: 11 U/L (ref 13–56)
ALT SERPL-CCNC: 5 U/L (ref 13–56)
ANION GAP BLD CALC-SCNC: 11 MMOL/L (ref 3–18)
ANION GAP BLD CALC-SCNC: 9 MMOL/L (ref 3–18)
APPEARANCE UR: ABNORMAL
ARTERIAL PATENCY WRIST A: YES
AST SERPL W P-5'-P-CCNC: 21 U/L (ref 15–37)
AST SERPL W P-5'-P-CCNC: 24 U/L (ref 15–37)
BACTERIA URNS QL MICRO: ABNORMAL /HPF
BASE DEFICIT BLD-SCNC: 5 MMOL/L
BASOPHILS # BLD AUTO: 0 K/UL (ref 0–0.1)
BASOPHILS # BLD: 0 % (ref 0–3)
BASOPHILS # BLD: 0 % (ref 0–3)
BDY SITE: ABNORMAL
BILIRUB SERPL-MCNC: 0.6 MG/DL (ref 0.2–1)
BILIRUB SERPL-MCNC: 0.8 MG/DL (ref 0.2–1)
BILIRUB UR QL: NEGATIVE
BLASTS NFR BLD: 0 %
BODY TEMPERATURE: 102.9
BUN SERPL-MCNC: 6 MG/DL (ref 7–18)
BUN SERPL-MCNC: 7 MG/DL (ref 7–18)
BUN/CREAT SERPL: 7 (ref 12–20)
BUN/CREAT SERPL: 7 (ref 12–20)
CALCIUM SERPL-MCNC: 7.2 MG/DL (ref 8.5–10.1)
CALCIUM SERPL-MCNC: 7.8 MG/DL (ref 8.5–10.1)
CHLORIDE SERPL-SCNC: 105 MMOL/L (ref 100–108)
CHLORIDE SERPL-SCNC: 108 MMOL/L (ref 100–108)
CO2 SERPL-SCNC: 23 MMOL/L (ref 21–32)
CO2 SERPL-SCNC: 23 MMOL/L (ref 21–32)
COLOR UR: ABNORMAL
CREAT SERPL-MCNC: 0.85 MG/DL (ref 0.6–1.3)
CREAT SERPL-MCNC: 0.95 MG/DL (ref 0.6–1.3)
DIFFERENTIAL METHOD BLD: ABNORMAL
DIFFERENTIAL METHOD BLD: ABNORMAL
EOSINOPHIL # BLD: 0 K/UL (ref 0–0.4)
EOSINOPHIL NFR BLD: 0 % (ref 0–5)
EOSINOPHIL NFR BLD: 0 % (ref 0–5)
EPITH CASTS URNS QL MICRO: ABNORMAL /LPF (ref 0–5)
ERYTHROCYTE [DISTWIDTH] IN BLOOD BY AUTOMATED COUNT: 14.3 % (ref 11.6–14.5)
ERYTHROCYTE [DISTWIDTH] IN BLOOD BY AUTOMATED COUNT: 14.4 % (ref 11.6–14.5)
ERYTHROCYTE [SEDIMENTATION RATE] IN BLOOD: 44 MM/HR (ref 0–20)
GAS FLOW.O2 O2 DELIVERY SYS: ABNORMAL L/MIN
GAS FLOW.O2 SETTING OXYMISER: 6 L/M
GLOBULIN SER CALC-MCNC: 2.8 G/DL (ref 2–4)
GLOBULIN SER CALC-MCNC: 3.2 G/DL (ref 2–4)
GLUCOSE SERPL-MCNC: 100 MG/DL (ref 74–99)
GLUCOSE SERPL-MCNC: 98 MG/DL (ref 74–99)
GLUCOSE UR STRIP.AUTO-MCNC: NEGATIVE MG/DL
HCO3 BLD-SCNC: 18.6 MMOL/L (ref 22–26)
HCT VFR BLD AUTO: 27.5 % (ref 35–45)
HCT VFR BLD AUTO: 30.9 % (ref 35–45)
HCT VFR BLD AUTO: 31.7 % (ref 35–45)
HGB BLD-MCNC: 10.5 G/DL (ref 12–16)
HGB BLD-MCNC: 10.7 G/DL (ref 12–16)
HGB BLD-MCNC: 9.3 G/DL (ref 12–16)
HGB UR QL STRIP: ABNORMAL
KETONES UR QL STRIP.AUTO: NEGATIVE MG/DL
LACTATE SERPL-SCNC: 2 MMOL/L (ref 0.4–2)
LACTATE SERPL-SCNC: 2.1 MMOL/L (ref 0.4–2)
LACTATE SERPL-SCNC: 2.5 MMOL/L (ref 0.4–2)
LEUKOCYTE ESTERASE UR QL STRIP.AUTO: ABNORMAL
LYMPHOCYTES # BLD AUTO: 1 % (ref 20–51)
LYMPHOCYTES # BLD AUTO: 2 % (ref 20–51)
LYMPHOCYTES # BLD: 0.2 K/UL (ref 0.8–3.5)
LYMPHOCYTES # BLD: 0.2 K/UL (ref 0.8–3.5)
MANUAL DIFFERENTIAL PERFORMED BLD QL: ABNORMAL
MCH RBC QN AUTO: 28.4 PG (ref 24–34)
MCH RBC QN AUTO: 28.4 PG (ref 24–34)
MCHC RBC AUTO-ENTMCNC: 33.8 G/DL (ref 31–37)
MCHC RBC AUTO-ENTMCNC: 33.8 G/DL (ref 31–37)
MCV RBC AUTO: 83.8 FL (ref 74–97)
MCV RBC AUTO: 84.1 FL (ref 74–97)
METAMYELOCYTES NFR BLD MANUAL: 0 %
METAMYELOCYTES NFR BLD MANUAL: 3 %
MONOCYTES # BLD: 0 K/UL (ref 0–1)
MONOCYTES # BLD: 0.1 K/UL (ref 0–1)
MONOCYTES NFR BLD AUTO: 0 % (ref 2–9)
MONOCYTES NFR BLD AUTO: 1 % (ref 2–9)
MYELOCYTES NFR BLD MANUAL: 0 %
NEUTS BAND NFR BLD MANUAL: 1 % (ref 0–5)
NEUTS BAND NFR BLD MANUAL: 42 % (ref 0–5)
NEUTS SEG # BLD: 11.2 K/UL (ref 1.8–8)
NEUTS SEG # BLD: 7.2 K/UL (ref 1.8–8)
NEUTS SEG NFR BLD AUTO: 54 % (ref 42–75)
NEUTS SEG NFR BLD AUTO: 96 % (ref 40–73)
NITRITE UR QL STRIP.AUTO: NEGATIVE
O2/TOTAL GAS SETTING VFR VENT: 44 %
PCO2 BLD: 28.8 MMHG (ref 35–45)
PH BLD: 7.43 [PH] (ref 7.35–7.45)
PH UR STRIP: 6 [PH] (ref 5–8)
PLATELET # BLD AUTO: 159 K/UL (ref 135–420)
PLATELET # BLD AUTO: 159 K/UL (ref 135–420)
PLATELET COMMENTS,PCOM: ABNORMAL
PMV BLD AUTO: 10.3 FL (ref 9.2–11.8)
PMV BLD AUTO: 10.7 FL (ref 9.2–11.8)
PO2 BLD: 198 MMHG (ref 80–100)
POTASSIUM SERPL-SCNC: 3 MMOL/L (ref 3.5–5.5)
POTASSIUM SERPL-SCNC: 3.1 MMOL/L (ref 3.5–5.5)
PROMYELOCYTES NFR BLD MANUAL: 0 %
PROT SERPL-MCNC: 4.3 G/DL (ref 6.4–8.2)
PROT SERPL-MCNC: 5.1 G/DL (ref 6.4–8.2)
PROT UR STRIP-MCNC: 30 MG/DL
RBC # BLD AUTO: 3.28 M/UL (ref 4.2–5.3)
RBC # BLD AUTO: 3.77 M/UL (ref 4.2–5.3)
RBC #/AREA URNS HPF: >100 /HPF (ref 0–5)
RBC MORPH BLD: ABNORMAL
RBC MORPH BLD: ABNORMAL
SAO2 % BLD: 100 % (ref 92–97)
SERVICE CMNT-IMP: ABNORMAL
SODIUM SERPL-SCNC: 139 MMOL/L (ref 136–145)
SODIUM SERPL-SCNC: 140 MMOL/L (ref 136–145)
SP GR UR REFRACTOMETRY: 1.02 (ref 1–1.03)
SPECIMEN TYPE: ABNORMAL
UROBILINOGEN UR QL STRIP.AUTO: 1 EU/DL (ref 0.2–1)
WBC # BLD AUTO: 20.7 K/UL (ref 4.6–13.2)
WBC # BLD AUTO: 7.5 K/UL (ref 4.6–13.2)
WBC MORPH BLD: ABNORMAL
WBC URNS QL MICRO: ABNORMAL /HPF (ref 0–5)

## 2017-05-14 PROCEDURE — 74011250637 HC RX REV CODE- 250/637: Performed by: OBSTETRICS & GYNECOLOGY

## 2017-05-14 PROCEDURE — 87086 URINE CULTURE/COLONY COUNT: CPT | Performed by: OBSTETRICS & GYNECOLOGY

## 2017-05-14 PROCEDURE — 74011250637 HC RX REV CODE- 250/637: Performed by: INTERNAL MEDICINE

## 2017-05-14 PROCEDURE — 93005 ELECTROCARDIOGRAM TRACING: CPT

## 2017-05-14 PROCEDURE — 74011636320 HC RX REV CODE- 636/320: Performed by: OBSTETRICS & GYNECOLOGY

## 2017-05-14 PROCEDURE — 81001 URINALYSIS AUTO W/SCOPE: CPT | Performed by: INTERNAL MEDICINE

## 2017-05-14 PROCEDURE — 36415 COLL VENOUS BLD VENIPUNCTURE: CPT | Performed by: INTERNAL MEDICINE

## 2017-05-14 PROCEDURE — 71010 XR CHEST PORT: CPT

## 2017-05-14 PROCEDURE — 77010033678 HC OXYGEN DAILY

## 2017-05-14 PROCEDURE — 65610000006 HC RM INTENSIVE CARE

## 2017-05-14 PROCEDURE — 85018 HEMOGLOBIN: CPT | Performed by: OBSTETRICS & GYNECOLOGY

## 2017-05-14 PROCEDURE — 74011250636 HC RX REV CODE- 250/636: Performed by: INTERNAL MEDICINE

## 2017-05-14 PROCEDURE — 74011000258 HC RX REV CODE- 258: Performed by: FAMILY MEDICINE

## 2017-05-14 PROCEDURE — 80053 COMPREHEN METABOLIC PANEL: CPT | Performed by: INTERNAL MEDICINE

## 2017-05-14 PROCEDURE — 85025 COMPLETE CBC W/AUTO DIFF WBC: CPT | Performed by: INTERNAL MEDICINE

## 2017-05-14 PROCEDURE — 87040 BLOOD CULTURE FOR BACTERIA: CPT | Performed by: FAMILY MEDICINE

## 2017-05-14 PROCEDURE — 80053 COMPREHEN METABOLIC PANEL: CPT | Performed by: OBSTETRICS & GYNECOLOGY

## 2017-05-14 PROCEDURE — 36600 WITHDRAWAL OF ARTERIAL BLOOD: CPT

## 2017-05-14 PROCEDURE — 74011250636 HC RX REV CODE- 250/636: Performed by: FAMILY MEDICINE

## 2017-05-14 PROCEDURE — 85652 RBC SED RATE AUTOMATED: CPT | Performed by: OBSTETRICS & GYNECOLOGY

## 2017-05-14 PROCEDURE — 71275 CT ANGIOGRAPHY CHEST: CPT

## 2017-05-14 PROCEDURE — 83605 ASSAY OF LACTIC ACID: CPT | Performed by: INTERNAL MEDICINE

## 2017-05-14 PROCEDURE — 86140 C-REACTIVE PROTEIN: CPT | Performed by: OBSTETRICS & GYNECOLOGY

## 2017-05-14 PROCEDURE — 74011250636 HC RX REV CODE- 250/636: Performed by: OBSTETRICS & GYNECOLOGY

## 2017-05-14 PROCEDURE — 85027 COMPLETE CBC AUTOMATED: CPT | Performed by: OBSTETRICS & GYNECOLOGY

## 2017-05-14 PROCEDURE — 83605 ASSAY OF LACTIC ACID: CPT | Performed by: FAMILY MEDICINE

## 2017-05-14 PROCEDURE — 82803 BLOOD GASES ANY COMBINATION: CPT

## 2017-05-14 PROCEDURE — 85014 HEMATOCRIT: CPT | Performed by: OBSTETRICS & GYNECOLOGY

## 2017-05-14 RX ORDER — SODIUM CHLORIDE 0.9 % (FLUSH) 0.9 %
5-10 SYRINGE (ML) INJECTION AS NEEDED
Status: DISCONTINUED | OUTPATIENT
Start: 2017-05-14 | End: 2017-05-19 | Stop reason: HOSPADM

## 2017-05-14 RX ORDER — SODIUM CHLORIDE, SODIUM LACTATE, POTASSIUM CHLORIDE, CALCIUM CHLORIDE 600; 310; 30; 20 MG/100ML; MG/100ML; MG/100ML; MG/100ML
150 INJECTION, SOLUTION INTRAVENOUS CONTINUOUS
Status: DISCONTINUED | OUTPATIENT
Start: 2017-05-14 | End: 2017-05-16

## 2017-05-14 RX ORDER — OXYCODONE AND ACETAMINOPHEN 10; 325 MG/1; MG/1
2 TABLET ORAL
Status: DISCONTINUED | OUTPATIENT
Start: 2017-05-14 | End: 2017-05-15

## 2017-05-14 RX ORDER — LORAZEPAM 2 MG/ML
1 INJECTION INTRAMUSCULAR ONCE
Status: DISCONTINUED | OUTPATIENT
Start: 2017-05-14 | End: 2017-05-14

## 2017-05-14 RX ORDER — ACETAMINOPHEN 10 MG/ML
1000 INJECTION, SOLUTION INTRAVENOUS
Status: DISPENSED | OUTPATIENT
Start: 2017-05-14 | End: 2017-05-15

## 2017-05-14 RX ORDER — POTASSIUM CHLORIDE 20MEQ/15ML
40 LIQUID (ML) ORAL
Status: COMPLETED | OUTPATIENT
Start: 2017-05-15 | End: 2017-05-14

## 2017-05-14 RX ORDER — LEVOFLOXACIN 5 MG/ML
750 INJECTION, SOLUTION INTRAVENOUS EVERY 24 HOURS
Status: DISCONTINUED | OUTPATIENT
Start: 2017-05-14 | End: 2017-05-18

## 2017-05-14 RX ADMIN — PIPERACILLIN SODIUM,TAZOBACTAM SODIUM 3.38 G: 3; .375 INJECTION, POWDER, FOR SOLUTION INTRAVENOUS at 13:41

## 2017-05-14 RX ADMIN — OXYCODONE HYDROCHLORIDE AND ACETAMINOPHEN 2 TABLET: 5; 325 TABLET ORAL at 07:58

## 2017-05-14 RX ADMIN — PIPERACILLIN SODIUM,TAZOBACTAM SODIUM 3.38 G: 3; .375 INJECTION, POWDER, FOR SOLUTION INTRAVENOUS at 19:07

## 2017-05-14 RX ADMIN — SODIUM CHLORIDE, SODIUM LACTATE, POTASSIUM CHLORIDE, AND CALCIUM CHLORIDE 125 ML/HR: 600; 310; 30; 20 INJECTION, SOLUTION INTRAVENOUS at 13:42

## 2017-05-14 RX ADMIN — IBUPROFEN 800 MG: 400 TABLET ORAL at 16:55

## 2017-05-14 RX ADMIN — ACETAMINOPHEN 1000 MG: 10 INJECTION, SOLUTION INTRAVENOUS at 20:28

## 2017-05-14 RX ADMIN — LEVOFLOXACIN 750 MG: 5 INJECTION, SOLUTION INTRAVENOUS at 13:41

## 2017-05-14 RX ADMIN — SODIUM CHLORIDE 1000 ML: 900 INJECTION, SOLUTION INTRAVENOUS at 23:38

## 2017-05-14 RX ADMIN — IBUPROFEN 800 MG: 400 TABLET ORAL at 04:35

## 2017-05-14 RX ADMIN — SODIUM CHLORIDE 2313 ML: 900 INJECTION, SOLUTION INTRAVENOUS at 13:35

## 2017-05-14 RX ADMIN — SODIUM CHLORIDE, SODIUM LACTATE, POTASSIUM CHLORIDE, AND CALCIUM CHLORIDE 125 ML/HR: 600; 310; 30; 20 INJECTION, SOLUTION INTRAVENOUS at 19:50

## 2017-05-14 RX ADMIN — PIPERACILLIN SODIUM,TAZOBACTAM SODIUM 3.38 G: 3; .375 INJECTION, POWDER, FOR SOLUTION INTRAVENOUS at 23:38

## 2017-05-14 RX ADMIN — POTASSIUM CHLORIDE 40 MEQ: 20 SOLUTION ORAL at 23:39

## 2017-05-14 RX ADMIN — IOPAMIDOL 100 ML: 755 INJECTION, SOLUTION INTRAVENOUS at 13:00

## 2017-05-14 RX ADMIN — OXYCODONE HYDROCHLORIDE AND ACETAMINOPHEN 2 TABLET: 5; 325 TABLET ORAL at 01:11

## 2017-05-14 RX ADMIN — ACETAMINOPHEN 1000 MG: 10 INJECTION, SOLUTION INTRAVENOUS at 10:55

## 2017-05-14 NOTE — PROGRESS NOTES
Plan to transfer patient to ICU. Will complete CT of chest/abd/pelvis prior to transfer. Received report and reviewed events of today and SBAR.    1215 admitted to ICU. HR rapid at 120. Anxious and emotional being  from baby. Cooling blanket applied to bed per Dr Juliet Arias but temp 98.6 orally. Discussed status and VS taken. IV fluid bolus initiated and anitbiotics noted. And initiated. 56 mother and mother in law in to comfort patient and assist with breast feeding. And using breast pump. 1400 called to room. States IV hurts. Left ac site appears infiltrated. Iv stopped and removed, warm compress applied. 1455 assisted to bedside commode to urinate. HR rapidly bursts to 160s. Pad changed, perineal ice pack aplied and large amt urine noted. Light lochia, pink. 1530 second IV hurts and is inflamed. Antibiotics stopped and IV fluid bolus also stopped. Attempt to restart, unsuccessful,  1600 Saundra, Nursing supervisor attempted to restart multiple times also unsuccessful. Call to Dr Juliet Arias to update. 1640 Critical lactic level called to Dr Juliet Arias. Will continue to track until normal level. 1700 ED medic Leeroy Hy able to start IV rt wrist #20 flushing beautifully. 1800 Dr Prince Varma in consulted by Dr Juliet Arias. Attempt to start additional IV access with ultrasound. Patient refusing additional attempts. States she will drink lots of water.

## 2017-05-14 NOTE — PROGRESS NOTES
conducted an initial consultation and Spiritual Assessment for Tennille Lockwood, who is a 36 y.o.,female. Patients Primary Language is: [place]. According to the patients EMR Evangelical Affiliation is: No Christian. The reason the Patient came to the hospital is:   Patient Active Problem List    Diagnosis Date Noted    Anemia due to acute blood loss 05/14/2017    Dyspnea 05/14/2017    Sepsis ([facility] [address] 05/14/2017    Septic shock ([facility] [address] 05/14/2017    Fever 05/14/2017    Normal spontaneous vaginal delivery 05/13/2017    Laceration of labial mucosa without complication 61/50/2976       was aware this patient was the recipient of RRT earlier in the day. Made a visit to the ICU just to check in. Nurse was doing procedure, so  was unable to properly assess. He asked about the baby which she delivered normally before the post partum distress. Helped to affirm and normalize patient's emotions. Promised return visit if needed. The  provided the following Interventions:    Provided chaplaincy education. Provided information about Spiritual Care Services. Offered assurance of prayer on patient's behalf. Chart reviewed. The following outcomes where achieved:  Patient shared limited information about both their medical narrative and spiritual journey/beliefs. Patient expressed gratitude for 's visit. Assessment:  Patient does not have any [facility]/cultural needs that will affect patients preferences in health care. Plan:  Chaplains will continue to follow and will provide pastoral care on an as needed/requested basis.  recommends bedside caregivers page  on duty if patient shows signs of acute spiritual or emotional distress.     The [address] [place] [address]  [phone]

## 2017-05-14 NOTE — PROGRESS NOTES
STAT ABG'S OBTAINED PER DR. PHILLIPS. PLACED PATIENT ON NRB AS ORDERED POST DRAW. STAT EKG ALSO DONE.

## 2017-05-14 NOTE — CONSULTS
Pulmonology Intensive Care Unit Initial Assessment    Subjective:        Subjective:     Critical Care Initial Evaluation Note: 5/14/2017 5:49 PM    Jaclyn Colon is a 36 y.o. female in [facility] who admitted on 05/12 with pregnancy at 39w0d for active labor. Prenatal course was normal. She had a prolong L&D yesterday with laceration of labial and uneventful vaginal delivery. Today is her postpartum day 1. Patient started to have trouble breathing and was spiking temps of 102. Stat CTA of chest, abdomen and pelvis were performed and patient was transferred to ICU. At present patient denies any trouble breathing, she says she is anxious as they have tried to start multiple peripheral iv's and caused a lot pain. This is her first pregnancy. Non smoker, never had any problems with her breathing in the past.    Past Medical History:   Diagnosis Date    Anemia due to acute blood loss 5/14/2017      History reviewed. No pertinent surgical history. Prior to Admission medications    Medication Sig Start Date End Date Taking? Authorizing Provider   prenatal vit-iron fumarate-fa 27-0.8 mg tab tablet Take 1 Tab by mouth daily. Yes Historical Provider     No Known Allergies   Social History   Substance Use Topics    Smoking status: Never Smoker    Smokeless tobacco: Never Used    Alcohol use No      Family History   Problem Relation Age of Onset    No Known Problems Mother     No Known Problems Father         Review of Systems    Pertinent items are noted in HPI. Objective:     Vital signs reviewed.        05/12 1901 - 05/14 0700  In: -   Out: 2987 [Urine:2650]    Physical Exam:   HEENT: dry mucus membranes  Heart: s1,s2  Lungs : clear  Abd: soft  EXT : no edema or swelling  CNS: AAO x4    Data Review     Recent Results (from the past 24 hour(s))   HEMOGLOBIN    Collection Time: 05/14/17  5:33 AM   Result Value Ref Range    HGB 10.5 (L) 12.0 - 16.0 g/dL   HEMATOCRIT    Collection Time: 05/14/17  5:33 AM Result Value Ref Range    HCT 30.9 (L) 35.0 - 45.0 %   CBC WITH MANUAL DIFF    Collection Time: 05/14/17 10:45 AM   Result Value Ref Range    WBC 7.5 4.6 - 13.2 K/uL    RBC 3.77 (L) 4.20 - 5.30 M/uL    HGB 10.7 (L) 12.0 - 16.0 g/dL    HCT 31.7 (L) 35.0 - 45.0 %    MCV 84.1 74.0 - 97.0 FL    MCH 28.4 24.0 - 34.0 PG    MCHC 33.8 31.0 - 37.0 g/dL    RDW 14.3 11.6 - 14.5 %    PLATELET 612 402 - 732 K/uL    MPV 10.7 9.2 - 11.8 FL    NEUTROPHILS 96 (H) 40 - 73 %    BAND NEUTROPHILS 1 0 - 5 %    LYMPHOCYTES 2 (L) 20 - 51 %    MONOCYTES 1 (L) 2 - 9 %    EOSINOPHILS 0 0 - 5 %    BASOPHILS 0 0 - 3 %    METAMYELOCYTES 0 0 %    MYELOCYTES 0 0 %    PROMYELOCYTES 0 0 %    BLASTS 0 0 %    ABS. NEUTROPHILS 7.2 1.8 - 8.0 K/UL    ABS. LYMPHOCYTES 0.2 (L) 0.8 - 3.5 K/UL    ABS. MONOCYTES 0.1 0 - 1.0 K/UL    RBC COMMENTS NORMOCYTIC, NORMOCHROMIC      DF MANUAL      PLATELET COMMENTS CLUMPED PLATELETS      DIFFERENTIAL MANUAL DIFFERENTIAL ORDERED     METABOLIC PANEL, COMPREHENSIVE    Collection Time: 05/14/17 10:45 AM   Result Value Ref Range    Sodium 139 136 - 145 mmol/L    Potassium 3.1 (L) 3.5 - 5.5 mmol/L    Chloride 105 100 - 108 mmol/L    CO2 23 21 - 32 mmol/L    Anion gap 11 3.0 - 18 mmol/L    Glucose 98 74 - 99 mg/dL    BUN 6 (L) 7.0 - 18 MG/DL    Creatinine 0.85 0.6 - 1.3 MG/DL    BUN/Creatinine ratio 7 (L) 12 - 20      GFR est AA >60 >60 ml/min/1.73m2    GFR est non-AA >60 >60 ml/min/1.73m2    Calcium 7.8 (L) 8.5 - 10.1 MG/DL    Bilirubin, total 0.8 0.2 - 1.0 MG/DL    ALT (SGPT) 5 (L) 13 - 56 U/L    AST (SGOT) 24 15 - 37 U/L    Alk.  phosphatase 145 (H) 45 - 117 U/L    Protein, total 5.1 (L) 6.4 - 8.2 g/dL    Albumin 1.9 (L) 3.4 - 5.0 g/dL    Globulin 3.2 2.0 - 4.0 g/dL    A-G Ratio 0.6 (L) 0.8 - 1.7     SED RATE (ESR)    Collection Time: 05/14/17 10:45 AM   Result Value Ref Range    Sed rate, automated 44 (H) 0 - 20 mm/hr   POC G3    Collection Time: 05/14/17 10:45 AM   Result Value Ref Range    Device: NASAL CANNULA Flow rate (POC) 6 L/M    FIO2 (POC) 44 %    pH (POC) 7.428 7.35 - 7.45      pCO2 (POC) 28.8 (LL) 35 - 45 MMHG    pO2 (POC) 198 (H) 80 - 100 MMHG    HCO3 (POC) 18.6 (L) 22 - 26 MMOL/L    sO2 (POC) 100 (H) 92 - 97 %    Base deficit (POC) 5 mmol/L    Allens test (POC) YES      Site RIGHT RADIAL      Patient temp.  102.9      Specimen type (POC) ARTERIAL      Performed by Mindy Landry    EKG, 12 LEAD, INITIAL    Collection Time: 05/14/17 10:46 AM   Result Value Ref Range    Ventricular Rate 138 BPM    Atrial Rate 138 BPM    P-R Interval 128 ms    QRS Duration 74 ms    Q-T Interval 270 ms    QTC Calculation (Bezet) 409 ms    Calculated P Axis 28 degrees    Calculated R Axis 53 degrees    Diagnosis       Sinus tachycardia  T wave abnormality, consider inferior ischemia  Abnormal ECG  No previous ECGs available     LACTIC ACID, PLASMA    Collection Time: 05/14/17 11:15 AM   Result Value Ref Range    Lactic acid 2.1 (HH) 0.4 - 2.0 MMOL/L   LACTIC ACID, PLASMA    Collection Time: 05/14/17  3:22 PM   Result Value Ref Range    Lactic acid 2.5 (HH) 0.4 - 2.0 MMOL/L     Reviewed the cta chest, abdomen and pelvis : no abnormality    Assessment:     Active Problems:    Normal spontaneous vaginal delivery (5/13/2017)      Laceration of labial mucosa without complication (4/95/9361)      Anemia due to acute blood loss (5/14/2017)      Dyspnea (5/14/2017)      Sepsis ([address] (5/14/2017)      Septic shock (HCC) (5/14/2017)      Fever (5/14/2017)        Plan:     Continue ivf and abx  No indication for pressors at this time  Very mildly elevated lactate  Will attempt to place more peripheral iv using the sonosite  Patient refusing central line at this time  Source of fever probably the labial laceration  Obtain urine culture    More than 89 minutes of critical care time

## 2017-05-14 NOTE — PROGRESS NOTES
Patient went for a CT scan, and has now been transferred to ICU, bed 2. Report given to Jessica Olea [facility] [address].

## 2017-05-14 NOTE — PROGRESS NOTES
Patient is feeling better, abdominal pain is better after tylenol. Awaiting stat cmp in order to go to CT. After CT completed will transfer to  ICU for observation.

## 2017-05-14 NOTE — PROGRESS NOTES
Called to see patient for SOB while walking in [place], O2 sat dropped to 80's, hospitalist called to see patient. Now patient is hypotensive and tachycardic, stat labs ordered as well as EKG, ABG, CXR. O2 on. Hospitalist called stat.

## 2017-05-14 NOTE — CONSULTS
Medicine Consult    Patient:  Gabe Patton 36 y.o. female  Asked to evaluate patient by  Dr. Matthew Meyers  Primary Care Provider:  Catrina Raymundo MD  Date of Admission:  5/12/2017  Reason for Consult: SOB, fever, hypotension        Assessment/Plan     Patient Active Problem List   Diagnosis Code    Normal spontaneous vaginal delivery O80    Laceration of labial mucosa without complication I18.091D    Anemia due to acute blood loss D62    Dyspnea R06.00    Sepsis ([address] A41.9    Septic shock ([address] A41.9, R65.21    Fever R50.9       PLAN:  Transfer to ICU  Acute dyspena:Possible 2nd to fever/sepsis. With her recent hx of delivery, state CTA to R/O PE. Fever: Sepsis bundle, symptomatic care. Tylenol IV   Sepsis/sepitc shock possible intraabdominal resource: Sepsis protocol. IV zosyn and levaquin, IVF aggressively. F/U blood Cx and urine Cx. Discussed the case with Dr. Matthew Meyers. Postpartum care defer to primary team  Stat EKG, CXR  Monitor labs  Supportive care  DVT/GI prophylaxis   Full code  Discussed the case with the pt's family regarding the dx and the treatment plan. · Pulm: ABG  NC wean for O2 sats >94%; Incent spirometry   · Sedation: avoid   · Cardiac: BP stable  · Hem: check H/H watch for GI bleeding  · ID: Blood Cx; added zosyn and iv levaquin to treat as HCAP/UTI; check LA  · Renal: watch IOs and renal fn; avoid nephrotoxic agents; Nephrology consult   · GI: PPI;   · Neuro: stable mentation. · Endo:  · Fluids: IVF  · Nutrition: Oral with aspiration precautions; prn zofran   · Proph: DVt (SCDs) and GI proph  · Full code  · Total 75 min care    Consult to Intensivist/Dr. Patrick Alfaro. Discussed the case with her on the phone. Thank you for allowing us to participate in this patients care. HPI:   CC:  Gabe Patton is a 36 y.o. female in [facility] who admitted on 05/12 with pregnancy at 39w0d for active labor.  Prenatal course was normal. She had a prolong L&D yesterday with laceration of labial and uneventful vaginal delivery. Today is her postpartum day 1. I was called to RRT @10:20 am for patient with acute SOB and lower abdominal cramping, O2 sat dropped to 80's with tachycardia. Fever of 102.9. No CP/cough. No N/V. + shortness of breath, no palpitation, no N/V. She was placed with NSB mask, Sat O2 was back to 100%. She was placed with IVF and Abx, 20 min later, patient is hypotensive/wit BP of 80/50 mmhg with fever of 104. 6. Sepsis bundle initialized, she will be transfer to ICU for further care. Past Medical History:   Diagnosis Date    Anemia due to acute blood loss 5/14/2017     History reviewed. No pertinent surgical history. Social History   Substance Use Topics    Smoking status: Never Smoker    Smokeless tobacco: Never Used    Alcohol use No     Family History   Problem Relation Age of Onset    No Known Problems Mother     No Known Problems Father      No current facility-administered medications on file prior to encounter. No current outpatient prescriptions on file prior to encounter. No Known Allergies        Review of Systems  Constitutional: + fever/chills, +diaphoresis, malaise, fatigue or weight gain/loss or falls  Skin: no itching or rashes  HEENT: no ear discomfort, hearing loss, tinnitus, epistaxis or sore throat  EYES: no blurry vision, double vision or photophobia  CARDIOVASCULAR: no claudication, cp, palpitations, orthopnea, pnd or LE edema  PULMONARY: no cough, wheeze, shortness of breath or sputum production  GI: no nausea, vomiting, diarrhea. + abdominal pain.  no melena, hematemesis or brbpr  : no dysuria, hematuria  MUSCULOSKELETAL: no back pain, joint pain or myalgias  ENDOCRINE: no heat/cold intolerance, polyuria or polydipsia  HEME: no easy bruising or bleeding  NEURO: no unilateral weakness, numbness, tingling or seizures      Physical Exam:      Visit Vitals    /53 (BP 1 Location: Right leg, BP Patient Position: At rest)  Pulse (!) 132    Temp 98.1 °F (36.7 °C)    Resp 22    Ht 5' 7\" (1.702 m)    Wt 77.1 kg (170 lb)    SpO2 98%    Breastfeeding Yes    BMI 26.63 kg/m2     Body mass index is 26.63 kg/(m^2). Physical Exam:  GEN: well nourished, uncomfortable  HEENT: atraumatic, nose normal,oropharynx clear, MMM  NECK: supple, trachea midline, no supraclavicular or submandibular adenopathy noted  EYES: conjuctiva normal, lids with out lesions, PERRL  HEART: tachycardia  LUNGS: equal chest wall expansion, cta bl with out wheezes/rales or rhonchi  AB: soft, TTP diffusely/ +BS, nt/nd no organomegaly  NEURO: alert, awake and oriented x3, gait not assessed, cranial nerves intact, strength 5/5 bl UE and LE, sensation intact, reflexes nonpathological  SKIN: dry, intact, warm no breakdown noted        Laboratory Studies:    Recent Results (from the past 24 hour(s))   HEMOGLOBIN    Collection Time: 05/14/17  5:33 AM   Result Value Ref Range    HGB 10.5 (L) 12.0 - 16.0 g/dL   HEMATOCRIT    Collection Time: 05/14/17  5:33 AM   Result Value Ref Range    HCT 30.9 (L) 35.0 - 45.0 %   CBC WITH MANUAL DIFF    Collection Time: 05/14/17 10:45 AM   Result Value Ref Range    WBC 7.5 4.6 - 13.2 K/uL    RBC 3.77 (L) 4.20 - 5.30 M/uL    HGB 10.7 (L) 12.0 - 16.0 g/dL    HCT 31.7 (L) 35.0 - 45.0 %    MCV 84.1 74.0 - 97.0 FL    MCH 28.4 24.0 - 34.0 PG    MCHC 33.8 31.0 - 37.0 g/dL    RDW 14.3 11.6 - 14.5 %    PLATELET 169 232 - 185 K/uL    MPV 10.7 9.2 - 11.8 FL    NEUTROPHILS 96 (H) 40 - 73 %    BAND NEUTROPHILS 1 0 - 5 %    LYMPHOCYTES 2 (L) 20 - 51 %    MONOCYTES 1 (L) 2 - 9 %    EOSINOPHILS 0 0 - 5 %    BASOPHILS 0 0 - 3 %    METAMYELOCYTES 0 0 %    MYELOCYTES 0 0 %    PROMYELOCYTES 0 0 %    BLASTS 0 0 %    ABS. NEUTROPHILS 7.2 1.8 - 8.0 K/UL    ABS. LYMPHOCYTES 0.2 (L) 0.8 - 3.5 K/UL    ABS.  MONOCYTES 0.1 0 - 1.0 K/UL    RBC COMMENTS NORMOCYTIC, NORMOCHROMIC      DF MANUAL      PLATELET COMMENTS CLUMPED PLATELETS      DIFFERENTIAL MANUAL DIFFERENTIAL ORDERED     METABOLIC PANEL, COMPREHENSIVE    Collection Time: 05/14/17 10:45 AM   Result Value Ref Range    Sodium 139 136 - 145 mmol/L    Potassium 3.1 (L) 3.5 - 5.5 mmol/L    Chloride 105 100 - 108 mmol/L    CO2 23 21 - 32 mmol/L    Anion gap 11 3.0 - 18 mmol/L    Glucose 98 74 - 99 mg/dL    BUN 6 (L) 7.0 - 18 MG/DL    Creatinine 0.85 0.6 - 1.3 MG/DL    BUN/Creatinine ratio 7 (L) 12 - 20      GFR est AA >60 >60 ml/min/1.73m2    GFR est non-AA >60 >60 ml/min/1.73m2    Calcium 7.8 (L) 8.5 - 10.1 MG/DL    Bilirubin, total 0.8 0.2 - 1.0 MG/DL    ALT (SGPT) 5 (L) 13 - 56 U/L    AST (SGOT) 24 15 - 37 U/L    Alk. phosphatase 145 (H) 45 - 117 U/L    Protein, total 5.1 (L) 6.4 - 8.2 g/dL    Albumin 1.9 (L) 3.4 - 5.0 g/dL    Globulin 3.2 2.0 - 4.0 g/dL    A-G Ratio 0.6 (L) 0.8 - 1.7     SED RATE (ESR)    Collection Time: 05/14/17 10:45 AM   Result Value Ref Range    Sed rate, automated 44 (H) 0 - 20 mm/hr   POC G3    Collection Time: 05/14/17 10:45 AM   Result Value Ref Range    Device: NASAL CANNULA      Flow rate (POC) 6 L/M    FIO2 (POC) 44 %    pH (POC) 7.428 7.35 - 7.45      pCO2 (POC) 28.8 (LL) 35 - 45 MMHG    pO2 (POC) 198 (H) 80 - 100 MMHG    HCO3 (POC) 18.6 (L) 22 - 26 MMOL/L    sO2 (POC) 100 (H) 92 - 97 %    Base deficit (POC) 5 mmol/L    Allens test (POC) YES      Site RIGHT RADIAL      Patient temp.  102.9      Specimen type (POC) ARTERIAL      Performed by Beba Escobedo    EKG, 12 LEAD, INITIAL    Collection Time: 05/14/17 10:46 AM   Result Value Ref Range    Ventricular Rate 138 BPM    Atrial Rate 138 BPM    P-R Interval 128 ms    QRS Duration 74 ms    Q-T Interval 270 ms    QTC Calculation (Bezet) 409 ms    Calculated P Axis 28 degrees    Calculated R Axis 53 degrees    Diagnosis       Sinus tachycardia  T wave abnormality, consider inferior ischemia  Abnormal ECG  No previous ECGs available     LACTIC ACID, PLASMA    Collection Time: 05/14/17 11:15 AM   Result Value Ref Range    Lactic acid 2.1 (HH) 0.4 - 2.0 MMOL/L

## 2017-05-14 NOTE — ROUTINE PROCESS
0700-Bedside and Verbal shift change report given to SHAYY Alicia (oncoming nurse) by Sameer FELIX (offgoing nurse). Report included the following information SBAR, Kardex, Intake/Output and MAR.

## 2017-05-14 NOTE — PROGRESS NOTES
Dr. Wilver Lopez at bedside, EKG, ABG's, CXR, etc. Being completed. Continue to monitor closely. For vital signs, see flow sheet. Respiratory therapist at bedside. 1010: Rapid response called. O2 mask @ 15ml,   1013: Sat 95% 101/59,   1015: Dr. Aguilar Headings @ bedside, Gabriela Chicas, RN @ bedside(from ICU) T- 102.9  1020- 100%  On room air now.  /55, Pulse 140  1040- oxygen increased to 6L NC 98/53 Pulse- 137  1045- 96/52, pulse-145, rr-22  1050- 104/50, pulse-136, resp- 22

## 2017-05-14 NOTE — PROGRESS NOTES
RESPONDED TO RRT FOR PATIENT WITH ABDOMINAL PAIN/CRAMPING AND REPORTED SAT OF 85% ON ROOM AIR/HR 'S. UPON THIS WRITER'S ARRIVAL, SPO2 99% ON RA.  INITIALLY PLACED ON NRB AND THEN CHANGED TO 2L NC.  BS CTA. PATIENT IS FEBRILE WITH TEMP 102.9. DR. Zimmer Laughter. NO FURTHER RESPIRATORY INTERVENTION REQUIRED.

## 2017-05-14 NOTE — PROGRESS NOTES
Spoke to patient and family about results of CT and CXR. No evidence for PE. Continue intravenous antibiotics. Patient now in ICU.

## 2017-05-14 NOTE — PROGRESS NOTES
Post-Partum Day Number 1 Progress Note    Gladies Bowels     Assessment:   Hospital Problems  Date Reviewed: 2017          Codes Class Noted POA    Anemia due to acute blood loss ICD-10-CM: D62  ICD-9-CM: 285.1  2017 No        Normal spontaneous vaginal delivery ICD-10-CM: O80  ICD-9-CM: 650  2017 No        Laceration of labial mucosa without complication QLR-33-QX: G51.777G  ICD-9-CM: 873.43  2017 No            Doing well, post partum day 1    Plan:  1. Continue routine postpartum and perineal care as well as maternal education. Information for the patient's :  Reema Sheris [[id number]]   Vaginal, Spontaneous Delivery   Patient doing well without significant complaint. Voiding without difficulty, normal lochia. Current Facility-Administered Medications   Medication Dose Route Frequency    prenatal vit-calcium-iron-fa (PRENATAL PLUS with CALCIUM) tablet 1 Tab  1 Tab Oral DAILY       Vitals:  Visit Vitals    /72 (BP 1 Location: Right arm, BP Patient Position: At rest)    Pulse 68    Temp 97.4 °F (36.3 °C)    Resp 17    Ht 5' 7\" (1.702 m)    Wt 77.1 kg (170 lb)    SpO2 99%    Breastfeeding Yes    BMI 26.63 kg/m2     Temp (24hrs), Av.9 °F (36.6 °C), Min:97.4 °F (36.3 °C), Max:98.3 °F (36.8 °C)        Exam:   Patient without distress. Abdomen soft, fundus firm, nontender                Perineum with normal lochia noted. Lower extremities are negative for swelling, cords or tenderness.     Labs:     Lab Results   Component Value Date/Time    WBC 17.3 2017 07:20 PM    WBC 6.6 2015 12:55 AM    WBC 4.8 2009 11:05 AM    HGB 10.5 2017 05:33 AM    HGB 12.1 2017 07:20 PM    HGB 10.9 2015 12:55 AM    HCT 30.9 2017 05:33 AM    HCT 35.4 2017 07:20 PM    HCT 31.3 2015 12:55 AM    PLATELET 058  07:20 PM    PLATELET 677  12:55 AM    PLATELET 752  11:05 AM Recent Results (from the past 24 hour(s))   HEMOGLOBIN    Collection Time: 05/14/17  5:33 AM   Result Value Ref Range    HGB 10.5 (L) 12.0 - 16.0 g/dL   HEMATOCRIT    Collection Time: 05/14/17  5:33 AM   Result Value Ref Range    HCT 30.9 (L) 35.0 - 45.0 %

## 2017-05-14 NOTE — PROGRESS NOTES
Arrived at bedside in response to RRT. Mandy primary nurse and Dr Julee Shepherd at bedside. Patient walking and complained of severe cramping and pain, with SOB. Assisted to bed , O2 placed and VS noted before this nurse arrival. 109/59 HR 80 and sat 100% on 6L  Temp noted to be 101.2 and then 102.9  Orders from Dr Julee Shepherd noted. Urine culture,Blood cultures CXR and call to Dr Win Letters made. Dr Julee Shepherd also ordered IV tylenol. H/h wnl and anti biotics ordered. Dismissed or completed at 1045. No plan to transfer at that time.

## 2017-05-15 PROBLEM — E83.42 HYPOMAGNESEMIA: Status: ACTIVE | Noted: 2017-05-15

## 2017-05-15 LAB
ANION GAP BLD CALC-SCNC: 11 MMOL/L (ref 3–18)
BASOPHILS # BLD AUTO: 0 K/UL (ref 0–0.1)
BASOPHILS # BLD: 0 % (ref 0–3)
BUN SERPL-MCNC: 10 MG/DL (ref 7–18)
BUN/CREAT SERPL: 13 (ref 12–20)
CALCIUM SERPL-MCNC: 7.6 MG/DL (ref 8.5–10.1)
CHLORIDE SERPL-SCNC: 110 MMOL/L (ref 100–108)
CO2 SERPL-SCNC: 20 MMOL/L (ref 21–32)
CREAT SERPL-MCNC: 0.8 MG/DL (ref 0.6–1.3)
CRP SERPL-MCNC: 9.6 MG/DL (ref 0–0.3)
DIFFERENTIAL METHOD BLD: ABNORMAL
EOSINOPHIL # BLD: 0 K/UL (ref 0–0.4)
EOSINOPHIL NFR BLD: 0 % (ref 0–5)
ERYTHROCYTE [DISTWIDTH] IN BLOOD BY AUTOMATED COUNT: 14.6 % (ref 11.6–14.5)
GLUCOSE SERPL-MCNC: 126 MG/DL (ref 74–99)
HCT VFR BLD AUTO: 26.7 % (ref 35–45)
HGB BLD-MCNC: 9.1 G/DL (ref 12–16)
LACTATE SERPL-SCNC: 1.8 MMOL/L (ref 0.4–2)
LYMPHOCYTES # BLD AUTO: 0 % (ref 20–51)
LYMPHOCYTES # BLD: 0 K/UL (ref 0.8–3.5)
MAGNESIUM SERPL-MCNC: 1.1 MG/DL (ref 1.6–2.6)
MCH RBC QN AUTO: 28.6 PG (ref 24–34)
MCHC RBC AUTO-ENTMCNC: 34.1 G/DL (ref 31–37)
MCV RBC AUTO: 84 FL (ref 74–97)
METAMYELOCYTES NFR BLD MANUAL: 3 %
MONOCYTES # BLD: 0.2 K/UL (ref 0–1)
MONOCYTES NFR BLD AUTO: 1 % (ref 2–9)
NEUTS BAND NFR BLD MANUAL: 47 % (ref 0–5)
NEUTS SEG # BLD: 9.2 K/UL (ref 1.8–8)
NEUTS SEG NFR BLD AUTO: 49 % (ref 42–75)
PLATELET # BLD AUTO: 152 K/UL (ref 135–420)
PMV BLD AUTO: 10 FL (ref 9.2–11.8)
POTASSIUM SERPL-SCNC: 3.8 MMOL/L (ref 3.5–5.5)
RBC # BLD AUTO: 3.18 M/UL (ref 4.2–5.3)
RBC MORPH BLD: ABNORMAL
SODIUM SERPL-SCNC: 141 MMOL/L (ref 136–145)
WBC # BLD AUTO: 18.7 K/UL (ref 4.6–13.2)
WBC MORPH BLD: ABNORMAL

## 2017-05-15 PROCEDURE — 83735 ASSAY OF MAGNESIUM: CPT | Performed by: HOSPITALIST

## 2017-05-15 PROCEDURE — 36415 COLL VENOUS BLD VENIPUNCTURE: CPT | Performed by: FAMILY MEDICINE

## 2017-05-15 PROCEDURE — 80048 BASIC METABOLIC PNL TOTAL CA: CPT | Performed by: FAMILY MEDICINE

## 2017-05-15 PROCEDURE — 74011250637 HC RX REV CODE- 250/637: Performed by: FAMILY MEDICINE

## 2017-05-15 PROCEDURE — 65610000006 HC RM INTENSIVE CARE

## 2017-05-15 PROCEDURE — 74011250636 HC RX REV CODE- 250/636: Performed by: INTERNAL MEDICINE

## 2017-05-15 PROCEDURE — 74011250637 HC RX REV CODE- 250/637: Performed by: OBSTETRICS & GYNECOLOGY

## 2017-05-15 PROCEDURE — 74011250637 HC RX REV CODE- 250/637: Performed by: INTERNAL MEDICINE

## 2017-05-15 PROCEDURE — 74011250636 HC RX REV CODE- 250/636: Performed by: HOSPITALIST

## 2017-05-15 PROCEDURE — 74011000258 HC RX REV CODE- 258: Performed by: FAMILY MEDICINE

## 2017-05-15 PROCEDURE — 85025 COMPLETE CBC W/AUTO DIFF WBC: CPT | Performed by: FAMILY MEDICINE

## 2017-05-15 PROCEDURE — 74011250636 HC RX REV CODE- 250/636: Performed by: FAMILY MEDICINE

## 2017-05-15 PROCEDURE — 83605 ASSAY OF LACTIC ACID: CPT | Performed by: INTERNAL MEDICINE

## 2017-05-15 RX ORDER — OXYCODONE AND ACETAMINOPHEN 5; 325 MG/1; MG/1
1-2 TABLET ORAL
Status: DISCONTINUED | OUTPATIENT
Start: 2017-05-15 | End: 2017-05-19 | Stop reason: HOSPADM

## 2017-05-15 RX ORDER — MAGNESIUM SULFATE HEPTAHYDRATE 40 MG/ML
2 INJECTION, SOLUTION INTRAVENOUS ONCE
Status: COMPLETED | OUTPATIENT
Start: 2017-05-15 | End: 2017-05-15

## 2017-05-15 RX ORDER — HEPARIN SODIUM 5000 [USP'U]/ML
5000 INJECTION, SOLUTION INTRAVENOUS; SUBCUTANEOUS EVERY 8 HOURS
Status: DISCONTINUED | OUTPATIENT
Start: 2017-05-15 | End: 2017-05-19

## 2017-05-15 RX ORDER — LANOLIN ALCOHOL/MO/W.PET/CERES
400 CREAM (GRAM) TOPICAL 2 TIMES DAILY
Status: COMPLETED | OUTPATIENT
Start: 2017-05-15 | End: 2017-05-17

## 2017-05-15 RX ADMIN — LEVOFLOXACIN 750 MG: 5 INJECTION, SOLUTION INTRAVENOUS at 14:55

## 2017-05-15 RX ADMIN — HEPARIN SODIUM 5000 UNITS: 5000 INJECTION, SOLUTION INTRAVENOUS; SUBCUTANEOUS at 11:54

## 2017-05-15 RX ADMIN — Medication 400 MG: at 11:55

## 2017-05-15 RX ADMIN — HEPARIN SODIUM 5000 UNITS: 5000 INJECTION, SOLUTION INTRAVENOUS; SUBCUTANEOUS at 18:50

## 2017-05-15 RX ADMIN — Medication 1 TABLET: at 08:49

## 2017-05-15 RX ADMIN — PIPERACILLIN SODIUM,TAZOBACTAM SODIUM 3.38 G: 3; .375 INJECTION, POWDER, FOR SOLUTION INTRAVENOUS at 06:03

## 2017-05-15 RX ADMIN — PIPERACILLIN SODIUM,TAZOBACTAM SODIUM 3.38 G: 3; .375 INJECTION, POWDER, FOR SOLUTION INTRAVENOUS at 18:50

## 2017-05-15 RX ADMIN — IBUPROFEN 800 MG: 400 TABLET ORAL at 21:39

## 2017-05-15 RX ADMIN — SODIUM CHLORIDE, SODIUM LACTATE, POTASSIUM CHLORIDE, AND CALCIUM CHLORIDE 125 ML/HR: 600; 310; 30; 20 INJECTION, SOLUTION INTRAVENOUS at 08:51

## 2017-05-15 RX ADMIN — OXYCODONE HYDROCHLORIDE AND ACETAMINOPHEN 1 TABLET: 10; 325 TABLET ORAL at 12:42

## 2017-05-15 RX ADMIN — PIPERACILLIN SODIUM,TAZOBACTAM SODIUM 3.38 G: 3; .375 INJECTION, POWDER, FOR SOLUTION INTRAVENOUS at 11:55

## 2017-05-15 RX ADMIN — PIPERACILLIN SODIUM,TAZOBACTAM SODIUM 3.38 G: 3; .375 INJECTION, POWDER, FOR SOLUTION INTRAVENOUS at 23:27

## 2017-05-15 RX ADMIN — IBUPROFEN 800 MG: 400 TABLET ORAL at 11:54

## 2017-05-15 RX ADMIN — MAGNESIUM SULFATE HEPTAHYDRATE 2 G: 40 INJECTION, SOLUTION INTRAVENOUS at 14:55

## 2017-05-15 RX ADMIN — IBUPROFEN 800 MG: 400 TABLET ORAL at 03:09

## 2017-05-15 RX ADMIN — Medication 400 MG: at 20:22

## 2017-05-15 RX ADMIN — SODIUM CHLORIDE, SODIUM LACTATE, POTASSIUM CHLORIDE, AND CALCIUM CHLORIDE 1000 ML: 600; 310; 30; 20 INJECTION, SOLUTION INTRAVENOUS at 10:18

## 2017-05-15 RX ADMIN — MAGNESIUM SULFATE HEPTAHYDRATE 2 G: 40 INJECTION, SOLUTION INTRAVENOUS at 10:18

## 2017-05-15 RX ADMIN — OXYCODONE HYDROCHLORIDE AND ACETAMINOPHEN 2 TABLET: 5; 325 TABLET ORAL at 20:29

## 2017-05-15 NOTE — CALL BACK NOTE
Called by the nurse regarding elevated lactate from earlier but there is no repeat labs ordered. I will initiated sepsis protocol at this time. Otherwise cont management as planned.

## 2017-05-15 NOTE — INTERDISCIPLINARY ROUNDS
CRITICAL CARE INTERDISCIPLINARY ROUNDS    Patient Information:    Name:   Jason Montelongo    Age:   36 y.o. Admission Date:   5/12/2017    Critical Care Day: 2 (RRT 5-14)    Surgery Date:  na    Attending Provider:   Janice Birmingham MD    Surgeon:   Sid Mariano):   [address] Physician:  Hansel Fee    Code Status: Full Code    Problem List:     Patient Active Problem List   Diagnosis Code    Normal spontaneous vaginal delivery O80    Laceration of labial mucosa without complication B01.109O    Anemia due to acute blood loss D62    Dyspnea R06.00    Sepsis ([address] A41.9    Septic shock ([address] A41.9, R65.21    Fever R50.9    Endometritis following delivery O86.12       Principal Problem:  IUP (intrauterine pregnancy), incidental    During rounds the following quality care indicators and evidence based practices were addressed :  DVT Prophylaxis [place] Day na ([facility] n) ; Central Line Day:na Isolation:na; Antibiotic Stewardship: reviewed; Probiotics Necessary: na    Ventilator Bundle:      Sepsis Order Set:    Glycemic Control:   Recent Labs      05/15/17   0540  05/14/17   2220  05/14/17   1045   GLU  126*  100*  98   ;  Recent Labs      05/14/17   1045   PHI  7.428   PCO2I  28.8*   PO2I  198*    Adjustments     Acute MI/PCI:   Not applicable    Door to Balloon: Admission Time: 1808      Heart Failure:    Not applicable     SCIP Measures for other Surgeries:   Not applicable      Pneumonia:    Not applicable    Interdisciplinary team rounds were held with the following team membersNursing, Pharmacy, Physician and Respiratory Therapy. Plan of care discussed. Goals of Care/ Recommendations: start heparin 5000u, ok to transfer back, magnesium being replaced. See clinical pathway and/or care plan for interventions and desired outcomes.     Critical Care Discharge Status:  green

## 2017-05-15 NOTE — PROGRESS NOTES
Hospitalist Progress Note-critical care note     Patient: Rosa Gonzalez MRN: [id number]  CSN: [id number]    YOB: 1985  Age: 36 y.o. Sex: female    DOA: 5/12/2017 LOS:  LOS: 3 days            Chief complaint: sepsis, hypomagnesemia    Assessment/Plan         Patient Active Problem List   Diagnosis Code    Normal spontaneous vaginal delivery O80    Laceration of labial mucosa without complication I13.044Y    Anemia due to acute blood loss D62    Dyspnea R06.00    Sepsis ([address] A41.9    Septic shock (HCC) A41.9, R65.21    Fever R50.9    Endometritis following delivery O86.12       1. Acute dyspena:   -resolved  -cta negative for PE,   -cxr: Negative portable chest.  2 Sepsis/sepitc shock  -shock resolved  -sepsis improving with improving wbc. - possible intraabdominal resource  -continue iv zosyn and levaquin  -cx negative   3. Hypomagnesemia   Mg replacement     Discussed the case with Dr. Evon Lopez. Postpartum care defer to primary team    Family was at the bedside. All questions have been answered. 35 total min's spent on patient care including >50% on counseling/coordinating care. Discussed the above assessments. also discussed labs, medications and hospital course    Subjective : feel fine  Nurse: bp remained well     Can be transferred to tele later today if continues doing well. Review of systems:    General: No fevers or chills. Cardiovascular: No chest pain or pressure. No palpitations. Pulmonary: No shortness of breath. Gastrointestinal: No nausea, vomiting.      Vital signs/Intake and Output:  Visit Vitals    BP 97/54    Pulse (!) 104    Temp 97.7 °F (36.5 °C)    Resp 21    Ht 5' 7\" (1.702 m)    Wt 80.1 kg (176 lb 9.4 oz)    SpO2 100%    Breastfeeding Yes    BMI 27.66 kg/m2     Current Shift:  05/15 0701 - 05/15 1900  In: 240 [P.O.:240]  Out: -   Last three shifts:  05/13 1901 - 05/15 0700  In: 2350 [I.V.:2350]  Out: 1250 [Urine:1250]    Physical Exam:  General: WD, WN. Alert, cooperative, no acute distress    HEENT: NC, Atraumatic. PERRLA, anicteric sclerae. Lungs: CTA Bilaterally. No Wheezing/Rhonchi/Rales. Heart:  Tachycardia ,  No murmur, No Rubs, No Gallops  Abdomen: Soft, Non distended, Non tender.  +Bowel sounds,   Extremities: No c/c/e  Psych:   Not anxious or agitated. Neurologic:  No acute neurological deficit. Labs: Results:       Chemistry Recent Labs      05/15/17   0540 05/14/17 2220 05/14/17   1045   GLU  126*  100*  98   NA  141  140  139   K  3.8  3.0*  3.1*   CL  110*  108  105   CO2  20*  23  23   BUN  10  7  6*   CREA  0.80  0.95  0.85   CA  7.6*  7.2*  7.8*   AGAP  11  9  11   BUCR  13  7*  7*   AP   --   88  145*   TP   --   4.3*  5.1*   ALB   --   1.5*  1.9*   GLOB   --   2.8  3.2   AGRAT   --   0.5*  0.6*      CBC w/Diff Recent Labs      05/15/17   0540  05/14/17 2220 05/14/17   1045   WBC  18.7*  20.7*  7.5   RBC  3.18*  3.28*  3.77*   HGB  9.1*  9.3*  10.7*   HCT  26.7*  27.5*  31.7*   PLT  152  159  159   GRANS  49  54  96*   LYMPH  0*  1*  2*   EOS  0  0  0      Cardiac Enzymes No results for input(s): CPK, CKND1, MICHELLE in the last 72 hours. No lab exists for component: CKRMB, TROIP   Coagulation No results for input(s): PTP, INR, APTT in the last 72 hours. No lab exists for component: INREXT    Lipid Panel No results found for: CHOL, CHOLPOCT, CHOLX, CHLST, CHOLV, [id number], HDL, LDL, NLDLCT, DLDL, LDLC, DLDLP, 329964, VLDLC, VLDL, TGL, TGLX, TRIGL, [id number], TRIGP, TGLPOCT, [id number], CHHD, CHHDX   BNP No results for input(s): BNPP in the last 72 hours.    Liver Enzymes Recent Labs      05/14/17   2220   TP  4.3*   ALB  1.5*   AP  88   SGOT  21      Thyroid Studies Lab Results   Component Value Date/Time    TSH 2.13 11/23/2009 11:05 AM        Procedures/imaging: see electronic medical records for all procedures/Xrays and details which were not copied into this note but were reviewed prior to creation of Unique Loredo MD

## 2017-05-15 NOTE — PROGRESS NOTES
Readmission Risk Assessment: Low Risk and [facility] patients    RRAT Score: 1 - 12    Initial Assessment: Chart reviewed and noted Pt admitted for  and was transferred to ICU following SOB, tachycardia for septic shock. CM following for any needs when ready to dc. Emergency Contact: Mother-    Pertinent Medical Hx: anemia    PCP/Specialists: [facility]: none    DME:     Low Risk Care Transition Plan:  1. Evaluate for [facility] or [address] coordination of resources  2. Involve patient/caregiver in assessment, planning, education and implement of intervention. 3. CM daily patient care huddles/interdisciplinary rounds. 4. PCP/Specialist appointment within 7 - 10 days made prior to discharge. 5. Facilitate transportation and logistics for follow-up appointments.   6. Handoff to [address] Nurse Navigator or PCP practice

## 2017-05-15 NOTE — PROGRESS NOTES
[facility]  Pulmonary, Critical Care, and Sleep Medicine    Name: Khalif Scherer MRN: [id number]   : 1985 Hospital: [facility] [place]   Date: 5/15/2017        Critical Care Follow Up                                                [x]I have reviewed the flowsheet and previous days notes. Events, vitals, medications and notes from last 24 hours reviewed. Care plan discussed with staff, patient, family and on multidisciplinary rounds. IMPRESSION:   · Sepsis, possible source urine vs. Endometrium  · UTI, suspect CAUTI, catheter removed  · Normal spontaneous vaginal delivery  · Dyspnea likely due to above, negative CTA  · Anemia due to above  · Constipation,  Patient does not want laxative  · Hypomagensium  · Hypovolemia    · Code status: Full code      RECOMMENDATIONS:   · CVS:  Will give additional LR bolus. Continue IVU  · Respiratory: dc o2 supplement, no indication spo2 100% at Room air, continue ICS  · ID:  Continue abx   Follow up on urine and blood culture  · Hematology/Oncology:  No need for transfusion  · Renal:  Renal function stable  · GI/:  No need for morris  · Endocrine:   ssi  · Neurology/Pain/Sedation:  Prn pain meds  · Musculoskeletal:  No issues  · Skin/Wound: No issues  · FEN: Replete Magensium  · Prophylaxis: GI Prophylaxis not needed  DVT Prophylaxis with heparin  ·                                                               ·  PT/OT eval and treat. OOB when appropriate. · Further recommendations will be based on the patient's response to recommended treatment and results of the investigation ordered. Subjective/History of Present Illness:     Khalif Scherer has been seen and evaluated in ICU/   Patient is a 36 y.o. female with PMHx listed below. S/p normal vaginal deliver after estimated gestational age of 37weeks. Post partum  Patient developed fever, dyspnea and leukocystosis with elevated lactate.   Was transferred to ICU for intensive care.     This am feeling much better. I have stopped the nasal cannula. Pain tolerable. No dysuria. WBC slight improvement. Afebrile. No chest pain or sob. Wants to move up to ob floor,  HD stable. Not on any pressors. No BM. Pt refused laxative. Review of Systems:  A comprehensive review of systems was negative except for that written in the HPI. No Known Allergies   Past Medical History:   Diagnosis Date    Anemia due to acute blood loss 2017      Current Facility-Administered Medications   Medication Dose Route Frequency    lactated ringers bolus infusion 1,000 mL  1,000 mL IntraVENous ONCE    magnesium sulfate 2 g/50 ml IVPB (premix or compounded)  2 g IntraVENous ONCE    piperacillin-tazobactam (ZOSYN) 3.375 g in 0.9% sodium chloride (MBP/ADV) 100 mL MBP  3.375 g IntraVENous Q6H    levoFLOXacin (LEVAQUIN) 750 mg in D5W IVPB  750 mg IntraVENous Q24H    lactated ringers infusion  125 mL/hr IntraVENous CONTINUOUS    prenatal vit-calcium-iron-fa (PRENATAL PLUS with CALCIUM) tablet 1 Tab  1 Tab Oral DAILY       Lines: All central lines examined by me. No signs of erythema, induration, discharge.         Peripheral Intravenous Line:  Peripheral IV 17 Right Wrist (Active)   Site Assessment Clean, dry, & intact 5/15/2017  8:07 AM   Phlebitis Assessment 0 5/15/2017  8:07 AM   Infiltration Assessment 0 5/15/2017  8:07 AM   Dressing Status Clean, dry, & intact 5/15/2017  8:07 AM   Dressing Type Transparent 5/15/2017  8:07 AM   Hub Color/Line Status Infusing 5/15/2017  8:07 AM   Action Taken Open ports on tubing capped 5/15/2017  8:07 AM   Alcohol Cap Used Yes 5/15/2017  8:07 AM            Objective:   Vital Signs:    Visit Vitals    /51    Pulse (!) 102    Temp 97.7 °F (36.5 °C)    Resp 21    Ht 5' 7\" (1.702 m)    Wt 80.1 kg (176 lb 9.4 oz)    SpO2 100%    Breastfeeding Yes    BMI 27.66 kg/m2       O2 Device: Room air       Temp (24hrs), Av.1 °F (37.8 °C), Min:97.7 °F (36.5 °C), Max:104.6 °F (40.3 °C)       Intake/Output:   Last shift:      05/15 0701 - 05/15 1900  In: 240 [P.O.:240]  Out: 850 [Urine:850]    Last 3 shifts: 05/13 1901 - 05/15 0700  In: 2350 [I.V.:2350]  Out: 1250 [Urine:1250]      Intake/Output Summary (Last 24 hours) at 05/15/17 1006  Last data filed at 05/15/17 0915   Gross per 24 hour   Intake             2590 ml   Output             2100 ml   Net              490 ml       Last 3 Recorded Weights in this Encounter    05/12/17 1840 05/14/17 1256   Weight: 77.1 kg (170 lb) 80.1 kg (176 lb 9.4 oz)         ARDS network Guidelines: Lung protective strategy and Plateau pressure goals less than or equal to 30    Telemetry:normal sinus rhythm    Physical Exam:     General: Awake, NAD  Head: atraumatic, normocephalic  Eye: PERRLA, no scleral icterus, no pallor, no cyanosis  Nose: no sinus tenderness, no erythema, no induration, no discharge  Throat: no tonsillar enlargement, no erythema, no exudates, no oral thrush  Neck: supple, no thyromegaly, no JVD, no lymphadenopathy.  Trachea midline  Lung: adequate air entry bilateral equal, no rales, no rhonchi, no wheezing  Heart: S1 S2 present, no murmur, no gallop  Abdomen: soft, NT, ND, +BS  LE: no pedal edema, no cyanosis, no clubbing, 2+ peripheral pulses in DP  Lymphatic: no cervical/supraclavicular/axillary lymphadenopathy  Neurologic: no focal deficit      Data:         Chemistry Recent Labs      05/15/17   0931  05/15/17   0540  05/14/17   2220  05/14/17   1045   GLU   --   126*  100*  98   NA   --   141  140  139   K   --   3.8  3.0*  3.1*   CL   --   110*  108  105   CO2   --   20*  23  23   BUN   --   10  7  6*   CREA   --   0.80  0.95  0.85   CA   --   7.6*  7.2*  7.8*   MG  1.1*   --    --    --    AGAP   --   11  9  11   BUCR   --   13  7*  7*   AP   --    --   88  145*   TP   --    --   4.3*  5.1*   ALB   --    --   1.5*  1.9*   GLOB   --    --   2.8  3.2   AGRAT   --    --   0.5*  0.6*        Lactic Acid Lactic acid   Date Value Ref Range Status   05/15/2017 1.8 0.4 - 2.0 MMOL/L Final     Recent Labs      05/15/17   0232  05/14/17   2220  05/14/17   1522   LAC  1.8  2.0  2.5*        Liver Enzymes Protein, total   Date Value Ref Range Status   05/14/2017 4.3 (L) 6.4 - 8.2 g/dL Final     Albumin   Date Value Ref Range Status   05/14/2017 1.5 (L) 3.4 - 5.0 g/dL Final     Globulin   Date Value Ref Range Status   05/14/2017 2.8 2.0 - 4.0 g/dL Final     A-G Ratio   Date Value Ref Range Status   05/14/2017 0.5 (L) 0.8 - 1.7   Final     AST (SGOT)   Date Value Ref Range Status   05/14/2017 21 15 - 37 U/L Final     Alk. phosphatase   Date Value Ref Range Status   05/14/2017 88 45 - 117 U/L Final     Recent Labs      05/14/17   2220 05/14/17   1045   TP  4.3*  5.1*   ALB  1.5*  1.9*   GLOB  2.8  3.2   AGRAT  0.5*  0.6*   SGOT  21  24   AP  88  145*        CBC w/Diff Recent Labs      05/15/17   0540  05/14/17   2220 05/14/17   1045   WBC  18.7*  20.7*  7.5   RBC  3.18*  3.28*  3.77*   HGB  9.1*  9.3*  10.7*   HCT  26.7*  27.5*  31.7*   PLT  152  159  159   GRANS  49  54  96*   LYMPH  0*  1*  2*   EOS  0  0  0        Cardiac Enzymes No results found for: CPK, CKMMB, CKMB, RCK3, CKMBT, CKNDX, CKND1, MICHELLE, TROPT, TROIQ, DAISY, TROPT, TNIPOC, BNP, BNPP     BNP No results found for: BNP, BNPP, XBNPT     Coagulation No results for input(s): PTP, INR, APTT in the last 72 hours.     No lab exists for component: INREXT      Thyroid  Lab Results   Component Value Date/Time    TSH 2.13 11/23/2009 11:05 AM          Lipid Panel No results found for: CHOL, CHOLPOCT, CHOLX, CHLST, CHOLV, 160397, HDL, LDL, NLDLCT, DLDL, LDLC, DLDLP, 329352, VLDLC, VLDL, TGL, TGLX, TRIGL, [id number], TRIGP, TGLPOCT, 459996, CHHD, CHHDX     ABG Recent Labs      05/14/17   1045   PHI  7.428   PCO2I  28.8*   PO2I  198*   HCO3I  18.6*   FIO2I  44        Urinalysis Lab Results   Component Value Date/Time    Color LIGHT RED 05/14/2017 09:30 PM    Appearance CLOUDY 05/14/2017 09:30 PM    Specific gravity 1.025 05/14/2017 09:30 PM    pH (UA) 6.0 05/14/2017 09:30 PM    Protein 30 05/14/2017 09:30 PM    Glucose NEGATIVE  05/14/2017 09:30 PM    Ketone NEGATIVE  05/14/2017 09:30 PM    Bilirubin NEGATIVE  05/14/2017 09:30 PM    Urobilinogen 1.0 05/14/2017 09:30 PM    Nitrites NEGATIVE  05/14/2017 09:30 PM    Leukocyte Esterase MODERATE 05/14/2017 09:30 PM    Epithelial cells 10 to 12 05/14/2017 09:30 PM    Bacteria FEW 05/14/2017 09:30 PM    WBC 50 to 75 05/14/2017 09:30 PM    RBC >100 05/14/2017 09:30 PM        Micro  Recent Labs      05/14/17   2225  05/14/17   1030   CULT  NO GROWTH AFTER 9 HOURS  NO GROWTH AFTER 16 HOURS     Recent Labs      05/14/17   2225  05/14/17   1030   CULT  NO GROWTH AFTER 9 HOURS  NO GROWTH AFTER 16 HOURS        XR (Most Recent). CXR reviewed by me and compared with previous CXR   Results from Hospital Encounter encounter on 05/12/17   XR CHEST PORT   Narrative Indication:  Shortness of breath, fever    Comparison:  None    Time of study - 1026    Findings:  AP erect portable chest    The cardiomediastinal silhouette is normal.  The lungs are clear. The bony  structures appear unremarkable. Impression IMPRESSION:     Negative portable chest.                     CT (Most Recent)   Results from Hospital Encounter encounter on 05/12/17   CTA CHEST ABD PELV W WO CONT   Narrative Indication:  Shortness of breath, hypoxemia, fever, right-sided abdominal pain    Technique:  Volumetric scanning of the thorax, abdomen and pelvis with IV  contrast.  Thin overlapping coronal and sagittal MIP arteriograms were  reconstructed in order to properly assess vascular anatomy. Dose reduction techniques used: Automated exposure control, adjustment of the  mAs and/or kVp according to patient's size, and/or iterative reconstruction  techniques. Techniques utilized are listed in the medical record.     Comparison:  CT abdomen and pelvis from 01/04/11    Findings:    Lungs: Unremarkable. There is no evidence of pulmonary embolism. Mediastinum: Normal appearance showing no masses to suggest malignancy, either  primary or metastatic. The aorta shows no evidence of dissection. Heart: Normal in appearance. Pleura: Normal.    Chest wall: Normal in appearance. Liver: Normal in appearance. Spleen:  Normal.    Pancreas:  Normal.    Gallbladder and bile ducts:  Normal.    Adrenals:  Normal.    Kidneys:  Normal.    Aorta and Vascular Structures:  Normal.    Retroperitoneum:  Normal.    Bowel and Mesentery:  Normal.    Osseous structures: Unremarkable. Bladder:  Normal.    Bowel:  Normal.    Genitourinary tract: The uterus is enlarged compatible with the patient's  postpartum state. Peritoneal cavity:  Normal.           Impression IMPRESSION:    Enlarged uterus compatible with postpartum state    No acute abnormality demonstrated                   EKG No results found for this or any previous visit. ECHO No results found for this or any previous visit. PFT No flowsheet data found.      Other ASA reactivity:   Pre-albumin:   Ionized Calcium:   NH4:   T3, FT4:  Cortisol:  Urine Osm:  Urine Lytes:   HbA1c:      Recent Results (from the past 24 hour(s))   CULTURE, BLOOD    Collection Time: 05/14/17 10:30 AM   Result Value Ref Range    Special Requests: NO SPECIAL REQUESTS      Culture result: NO GROWTH AFTER 16 HOURS     CBC WITH MANUAL DIFF    Collection Time: 05/14/17 10:45 AM   Result Value Ref Range    WBC 7.5 4.6 - 13.2 K/uL    RBC 3.77 (L) 4.20 - 5.30 M/uL    HGB 10.7 (L) 12.0 - 16.0 g/dL    HCT 31.7 (L) 35.0 - 45.0 %    MCV 84.1 74.0 - 97.0 FL    MCH 28.4 24.0 - 34.0 PG    MCHC 33.8 31.0 - 37.0 g/dL    RDW 14.3 11.6 - 14.5 %    PLATELET 612 053 - 614 K/uL    MPV 10.7 9.2 - 11.8 FL    NEUTROPHILS 96 (H) 40 - 73 %    BAND NEUTROPHILS 1 0 - 5 %    LYMPHOCYTES 2 (L) 20 - 51 %    MONOCYTES 1 (L) 2 - 9 %    EOSINOPHILS 0 0 - 5 % BASOPHILS 0 0 - 3 %    METAMYELOCYTES 0 0 %    MYELOCYTES 0 0 %    PROMYELOCYTES 0 0 %    BLASTS 0 0 %    ABS. NEUTROPHILS 7.2 1.8 - 8.0 K/UL    ABS. LYMPHOCYTES 0.2 (L) 0.8 - 3.5 K/UL    ABS. MONOCYTES 0.1 0 - 1.0 K/UL    RBC COMMENTS NORMOCYTIC, NORMOCHROMIC      DF MANUAL      PLATELET COMMENTS CLUMPED PLATELETS      DIFFERENTIAL MANUAL DIFFERENTIAL ORDERED     METABOLIC PANEL, COMPREHENSIVE    Collection Time: 05/14/17 10:45 AM   Result Value Ref Range    Sodium 139 136 - 145 mmol/L    Potassium 3.1 (L) 3.5 - 5.5 mmol/L    Chloride 105 100 - 108 mmol/L    CO2 23 21 - 32 mmol/L    Anion gap 11 3.0 - 18 mmol/L    Glucose 98 74 - 99 mg/dL    BUN 6 (L) 7.0 - 18 MG/DL    Creatinine 0.85 0.6 - 1.3 MG/DL    BUN/Creatinine ratio 7 (L) 12 - 20      GFR est AA >60 >60 ml/min/1.73m2    GFR est non-AA >60 >60 ml/min/1.73m2    Calcium 7.8 (L) 8.5 - 10.1 MG/DL    Bilirubin, total 0.8 0.2 - 1.0 MG/DL    ALT (SGPT) 5 (L) 13 - 56 U/L    AST (SGOT) 24 15 - 37 U/L    Alk. phosphatase 145 (H) 45 - 117 U/L    Protein, total 5.1 (L) 6.4 - 8.2 g/dL    Albumin 1.9 (L) 3.4 - 5.0 g/dL    Globulin 3.2 2.0 - 4.0 g/dL    A-G Ratio 0.6 (L) 0.8 - 1.7     SED RATE (ESR)    Collection Time: 05/14/17 10:45 AM   Result Value Ref Range    Sed rate, automated 44 (H) 0 - 20 mm/hr   POC G3    Collection Time: 05/14/17 10:45 AM   Result Value Ref Range    Device: NASAL CANNULA      Flow rate (POC) 6 L/M    FIO2 (POC) 44 %    pH (POC) 7.428 7.35 - 7.45      pCO2 (POC) 28.8 (LL) 35 - 45 MMHG    pO2 (POC) 198 (H) 80 - 100 MMHG    HCO3 (POC) 18.6 (L) 22 - 26 MMOL/L    sO2 (POC) 100 (H) 92 - 97 %    Base deficit (POC) 5 mmol/L    Allens test (POC) YES      Site RIGHT RADIAL      Patient temp.  102.9      Specimen type (POC) ARTERIAL      Performed by Polly Pelletier    EKG, 12 LEAD, INITIAL    Collection Time: 05/14/17 10:46 AM   Result Value Ref Range    Ventricular Rate 138 BPM    Atrial Rate 138 BPM    P-R Interval 128 ms    QRS Duration 74 ms    Q-T Interval 270 ms    QTC Calculation (Bezet) 409 ms    Calculated P Axis 28 degrees    Calculated R Axis 53 degrees    Diagnosis       Sinus tachycardia  T wave abnormality, consider inferior ischemia  Abnormal ECG  No previous ECGs available     LACTIC ACID, PLASMA    Collection Time: 05/14/17 11:15 AM   Result Value Ref Range    Lactic acid 2.1 (HH) 0.4 - 2.0 MMOL/L   LACTIC ACID, PLASMA    Collection Time: 05/14/17  3:22 PM   Result Value Ref Range    Lactic acid 2.5 (HH) 0.4 - 2.0 MMOL/L   URINALYSIS W/ RFLX MICROSCOPIC    Collection Time: 05/14/17  9:30 PM   Result Value Ref Range    Color LIGHT RED     Appearance CLOUDY      Specific gravity 1.025 1.005 - 1.030      pH (UA) 6.0 5.0 - 8.0      Protein 30 (A) NEG mg/dL    Glucose NEGATIVE  NEG mg/dL    Ketone NEGATIVE  NEG mg/dL    Bilirubin NEGATIVE  NEG      Blood LARGE (A) NEG      Urobilinogen 1.0 0.2 - 1.0 EU/dL    Nitrites NEGATIVE  NEG      Leukocyte Esterase MODERATE (A) NEG     URINE MICROSCOPIC ONLY    Collection Time: 05/14/17  9:30 PM   Result Value Ref Range    WBC 50 to 75 0 - 5 /hpf    RBC >100 (H) 0 - 5 /hpf    Epithelial cells 10 to 12 0 - 5 /lpf    Bacteria FEW (A) NEG /hpf   LACTIC ACID, PLASMA    Collection Time: 05/14/17 10:20 PM   Result Value Ref Range    Lactic acid 2.0 0.4 - 2.0 MMOL/L   METABOLIC PANEL, COMPREHENSIVE    Collection Time: 05/14/17 10:20 PM   Result Value Ref Range    Sodium 140 136 - 145 mmol/L    Potassium 3.0 (L) 3.5 - 5.5 mmol/L    Chloride 108 100 - 108 mmol/L    CO2 23 21 - 32 mmol/L    Anion gap 9 3.0 - 18 mmol/L    Glucose 100 (H) 74 - 99 mg/dL    BUN 7 7.0 - 18 MG/DL    Creatinine 0.95 0.6 - 1.3 MG/DL    BUN/Creatinine ratio 7 (L) 12 - 20      GFR est AA >60 >60 ml/min/1.73m2    GFR est non-AA >60 >60 ml/min/1.73m2    Calcium 7.2 (L) 8.5 - 10.1 MG/DL    Bilirubin, total 0.6 0.2 - 1.0 MG/DL    ALT (SGPT) 11 (L) 13 - 56 U/L    AST (SGOT) 21 15 - 37 U/L    Alk.  phosphatase 88 45 - 117 U/L    Protein, total 4.3 (L) 6.4 - 8.2 g/dL    Albumin 1.5 (L) 3.4 - 5.0 g/dL    Globulin 2.8 2.0 - 4.0 g/dL    A-G Ratio 0.5 (L) 0.8 - 1.7     CBC WITH AUTOMATED DIFF    Collection Time: 05/14/17 10:20 PM   Result Value Ref Range    WBC 20.7 (H) 4.6 - 13.2 K/uL    RBC 3.28 (L) 4.20 - 5.30 M/uL    HGB 9.3 (L) 12.0 - 16.0 g/dL    HCT 27.5 (L) 35.0 - 45.0 %    MCV 83.8 74.0 - 97.0 FL    MCH 28.4 24.0 - 34.0 PG    MCHC 33.8 31.0 - 37.0 g/dL    RDW 14.4 11.6 - 14.5 %    PLATELET 054 312 - 517 K/uL    MPV 10.3 9.2 - 11.8 FL    NEUTROPHILS 54 42 - 75 %    BAND NEUTROPHILS 42 (H) 0 - 5 %    LYMPHOCYTES 1 (L) 20 - 51 %    MONOCYTES 0 (L) 2 - 9 %    EOSINOPHILS 0 0 - 5 %    BASOPHILS 0 0 - 3 %    METAMYELOCYTES 3 (H) 0 %    ABS. NEUTROPHILS 11.2 (H) 1.8 - 8.0 K/UL    ABS. LYMPHOCYTES 0.2 (L) 0.8 - 3.5 K/UL    ABS. MONOCYTES 0.0 0 - 1.0 K/UL    ABS. EOSINOPHILS 0.0 0.0 - 0.4 K/UL    ABS. BASOPHILS 0.0 0.0 - 0.1 K/UL    RBC COMMENTS NORMOCYTIC, NORMOCHROMIC      WBC COMMENTS VACUOLATED POLYS      DF MANUAL     CULTURE, BLOOD    Collection Time: 05/14/17 10:25 PM   Result Value Ref Range    Special Requests: NO SPECIAL REQUESTS      Culture result: NO GROWTH AFTER 9 HOURS     LACTIC ACID, PLASMA    Collection Time: 05/15/17  2:32 AM   Result Value Ref Range    Lactic acid 1.8 0.4 - 2.0 MMOL/L   CBC WITH AUTOMATED DIFF    Collection Time: 05/15/17  5:40 AM   Result Value Ref Range    WBC 18.7 (H) 4.6 - 13.2 K/uL    RBC 3.18 (L) 4.20 - 5.30 M/uL    HGB 9.1 (L) 12.0 - 16.0 g/dL    HCT 26.7 (L) 35.0 - 45.0 %    MCV 84.0 74.0 - 97.0 FL    MCH 28.6 24.0 - 34.0 PG    MCHC 34.1 31.0 - 37.0 g/dL    RDW 14.6 (H) 11.6 - 14.5 %    PLATELET 003 495 - 898 K/uL    MPV 10.0 9.2 - 11.8 FL    NEUTROPHILS 49 42 - 75 %    BAND NEUTROPHILS 47 (H) 0 - 5 %    LYMPHOCYTES 0 (L) 20 - 51 %    MONOCYTES 1 (L) 2 - 9 %    EOSINOPHILS 0 0 - 5 %    BASOPHILS 0 0 - 3 %    METAMYELOCYTES 3 (H) 0 %    ABS. NEUTROPHILS 9.2 (H) 1.8 - 8.0 K/UL    ABS. LYMPHOCYTES 0.0 (L) 0.8 - 3.5 K/UL    ABS. MONOCYTES 0.2 0 - 1.0 K/UL    ABS. EOSINOPHILS 0.0 0.0 - 0.4 K/UL    ABS. BASOPHILS 0.0 0.0 - 0.1 K/UL    RBC COMMENTS NORMOCYTIC, NORMOCHROMIC      WBC COMMENTS VACUOLATED POLYS      DF MANUAL     METABOLIC PANEL, BASIC    Collection Time: 05/15/17  5:40 AM   Result Value Ref Range    Sodium 141 136 - 145 mmol/L    Potassium 3.8 3.5 - 5.5 mmol/L    Chloride 110 (H) 100 - 108 mmol/L    CO2 20 (L) 21 - 32 mmol/L    Anion gap 11 3.0 - 18 mmol/L    Glucose 126 (H) 74 - 99 mg/dL    BUN 10 7.0 - 18 MG/DL    Creatinine 0.80 0.6 - 1.3 MG/DL    BUN/Creatinine ratio 13 12 - 20      GFR est AA >60 >60 ml/min/1.73m2    GFR est non-AA >60 >60 ml/min/1.73m2    Calcium 7.6 (L) 8.5 - 10.1 MG/DL   MAGNESIUM    Collection Time: 05/15/17  9:31 AM   Result Value Ref Range    Magnesium 1.1 (L) 1.6 - 2.6 mg/dL         Best practice : All below core measures reviewed and addressed:   [x] Antibiotic choice. [x] Severe Sepsis bundles follwed; SIRS screen met? Yes       [x] Fluids. [x] Lactic acid ordered- initial and repeat Q6hrs if elevated till normalized. [x] Cultures drawn. [x] Antibiotic administered within 1hr-ICU and 3hrs ED. [x] Large bore IV- 2 sites           [] Pressors aim MAP >65mmHg.  [] Steroids. [x] Glycemic control. [x] Infection control. [] Mech. Ventilated patients- aim to keep peak plateau pressure 31-11QZ H2O.  [] HOB at >= 30 degree. [] Stress ulcer prophylaxis. [x] DVT prophylaxis. [] Central Line Bundle Followed. [] Boateng Bundle Followed. [] Ventilator Bundle Followed. (Daily sedation holiday to assess readiness for weaning from ventilator & SBT trial starting at 6.00 am, HOB 30-degree elevation, Chlorhexidine mouth washes & Routine oral care every 4 hours, [place] tube to suction at 20-30 cm H2O, Maintain [place] tube with 5-10ml air every 4 hours, DVT prophylaxis, GI prophylaxis etc.).     The patient is: [] acutely ill Risk of deterioration: [x] moderate    [] critically ill  [] high     [x]See my orders for details    My assessment, plan of care, findings, medications, side effects etc were discussed with:  [x] Nurse [x] PT/OT    [] Respiratory therapy []     [x] Family: answered all questions to satisfaction [] Patient: answered all questions to satisfaction   [] Pharmacist []      [] Case & management strategies discussed today on multidisciplinary rounds    High complexity decision making was performed during the evaluation of this patient at high risk for decompensation with multiple organ involvement    [x]Total critical care time spent on reviewing the case/medical record/data/notes/EMR/patient examination/documentation/coordinating care with nurse/consultants, exclusive of procedures 55minutes with complex decision making performed and > 50% time spent in face to face evaluation.       Angel Coelho MD  5/15/2017

## 2017-05-15 NOTE — PROGRESS NOTES
@1900  Pt taken over awake alert oriented in bed on room air spo2 97%. Vital signs fluctuates, pt verbalized that she only gets cramping if she moves but does not require any pain killers at this time. Assessment done and documented in appropriate flow sheets. Nursing management continues. @2341 relatives at bedside , pt requested that her mom spends the night , this was allowed care continues. @2028 temp elevated, cramps to abdomen. Tylenol administered as prescribed observation continues. @2300 Pt SBP in the 80's . [place] answering service was called and request made to speak to DR on call ,which they informed is Dr. Minerva Hastings ,awaiting response. Pt asleep at present care continues. @2135 Dr Arianne Cool was called pt SBP remains in 80's and potassium 3.0 mmol/L , order for 1000mls bolus NACL and potassium 40 meq oral administered as prescribed observation continues. @56 Mother baby nurse and myself at bedside ,checked fundus and documented in chart per mother baby nurse instructions. Nursing management continues. @0200 pt asleep intermittently ,vital signs improved, flacc 0, care continues. @9631 pt voided sitz bath done pad had slight amount of pink drainage present. Vital signs remains stable care continues. @80 Dr Arianne Cool checked in on unit updated on pt vital signs no new orders at this time continues to monitor  @0530 no new developments nursing observation continues. @5693 Bedside and Verbal shift change report given to Crystal Laguerre (oncoming nurse) by Lari Homans (offgoing nurse). Report included the following information SBAR, Kardex, Intake/Output, MAR, Recent Results and Med Rec Status.    Alarm parameters reviewed, on and audible Appropriate for patient clinical condition

## 2017-05-15 NOTE — PROGRESS NOTES
S: Pt. Is feeling better- urinating well no burning or pelvic pain with urination, has some intermittent uterine cramping that has improved. O: AFVSS  Fundus firm and below umbilicus- tenderness at fundal area with palpation. A/P: PPD 2 s/p - to telemetry today and mother baby hopefully tomorrow  1) Continue Zosyn and Levaquin  2) Continue to monitor labs and vital signs  3) Continue postpartum care.

## 2017-05-15 NOTE — ANESTHESIA POSTPROCEDURE EVALUATION
5/15/2017  11:00 AM    Laboring Epidural Follow-up Note     Referring physician: Maty Carrizales, *   Patient status post vaginal delivery with labor epidural    Visit Vitals    /68    Pulse (!) 107    Temp 36.8 °C (98.2 °F)    Resp 18    Ht 5' 7\" (1.702 m)    Wt 80.1 kg (176 lb 9.4 oz)    SpO2 100%    Breastfeeding Yes    BMI 27.66 kg/m2       Epidural removed by L&D staff  No sedation, pruritis noted. Adequate analgesia.   No obvious anesthesia complications          Lora Candelaria, CRNA

## 2017-05-15 NOTE — PROGRESS NOTES
Post-Partum Day Number 2 Progress Note    Jason Reginald     Assessment:   Hospital Problems  Date Reviewed: 2017          Codes Class Noted POA    Anemia due to acute blood loss ICD-10-CM: D62  ICD-9-CM: 285.1  2017 No        Dyspnea ICD-10-CM: R06.00  ICD-9-CM: 786.09  2017 No        Sepsis ([address] ICD-10-CM: A41.9  ICD-9-CM: 038.9, 995.91  2017 No        Septic shock (HCC) ICD-10-CM: A41.9, R65.21  ICD-9-CM: 038.9, 785.52, 995.92  2017 No        Fever ICD-10-CM: R50.9  ICD-9-CM: 780.60  2017 No        Endometritis following delivery ICD-10-CM: O86.12  ICD-9-CM: 670.12  2017 No        Normal spontaneous vaginal delivery ICD-10-CM: O80  ICD-9-CM: 650  2017 No        Laceration of labial mucosa without complication VMX-[id number]: B63.544P  ICD-9-CM: 873.43  2017 No            Stable, post partum day 2    Plan: Possible transfer to mother/baby today if ok with hospitalist.  Patient encouraged to continue to pump if desires to breastfeed. Information for the patient's :  Radha Galaviz [[id number]]   Vaginal, Spontaneous Delivery   Patient doing well without significant complaint. Denies headache, abdominal pain, shortness of breath, chest pain. Normal lochia.     Current Facility-Administered Medications   Medication Dose Route Frequency    lactated ringers bolus infusion 1,000 mL  1,000 mL IntraVENous ONCE    magnesium sulfate 2 g/50 ml IVPB (premix or compounded)  2 g IntraVENous ONCE    piperacillin-tazobactam (ZOSYN) 3.375 g in 0.9% sodium chloride (MBP/ADV) 100 mL MBP  3.375 g IntraVENous Q6H    levoFLOXacin (LEVAQUIN) 750 mg in D5W IVPB  750 mg IntraVENous Q24H    lactated ringers infusion  125 mL/hr IntraVENous CONTINUOUS    prenatal vit-calcium-iron-fa (PRENATAL PLUS with CALCIUM) tablet 1 Tab  1 Tab Oral DAILY       Vitals:  Visit Vitals    /51    Pulse (!) 102    Temp 97.7 °F (36.5 °C)    Resp 21    Ht 5' 7\" (1.702 m)    Wt 80.1 kg (176 lb 9.4 oz)    SpO2 100%    Breastfeeding Yes    BMI 27.66 kg/m2     Temp (24hrs), Av.1 °F (37.8 °C), Min:97.7 °F (36.5 °C), Max:104.6 °F (40.3 °C)      Exam:         Patient without distress. Abdomen soft, fundus firm, nontender                 Lower extremities are negative for swelling, cords or tenderness. Labs:     Lab Results   Component Value Date/Time    WBC 18.7 05/15/2017 05:40 AM    WBC 20.7 2017 10:20 PM    WBC 7.5 2017 10:45 AM    HGB 9.1 05/15/2017 05:40 AM    HGB 9.3 2017 10:20 PM    HGB 10.7 2017 10:45 AM    HCT 26.7 05/15/2017 05:40 AM    HCT 27.5 2017 10:20 PM    HCT 31.7 2017 10:45 AM    PLATELET 879  05:40 AM    PLATELET 232  10:20 PM    PLATELET 606  10:45 AM       Recent Results (from the past 24 hour(s))   CULTURE, BLOOD    Collection Time: 17 10:30 AM   Result Value Ref Range    Special Requests: NO SPECIAL REQUESTS      Culture result: NO GROWTH AFTER 16 HOURS     CBC WITH MANUAL DIFF    Collection Time: 17 10:45 AM   Result Value Ref Range    WBC 7.5 4.6 - 13.2 K/uL    RBC 3.77 (L) 4.20 - 5.30 M/uL    HGB 10.7 (L) 12.0 - 16.0 g/dL    HCT 31.7 (L) 35.0 - 45.0 %    MCV 84.1 74.0 - 97.0 FL    MCH 28.4 24.0 - 34.0 PG    MCHC 33.8 31.0 - 37.0 g/dL    RDW 14.3 11.6 - 14.5 %    PLATELET 116 517 - 182 K/uL    MPV 10.7 9.2 - 11.8 FL    NEUTROPHILS 96 (H) 40 - 73 %    BAND NEUTROPHILS 1 0 - 5 %    LYMPHOCYTES 2 (L) 20 - 51 %    MONOCYTES 1 (L) 2 - 9 %    EOSINOPHILS 0 0 - 5 %    BASOPHILS 0 0 - 3 %    METAMYELOCYTES 0 0 %    MYELOCYTES 0 0 %    PROMYELOCYTES 0 0 %    BLASTS 0 0 %    ABS. NEUTROPHILS 7.2 1.8 - 8.0 K/UL    ABS. LYMPHOCYTES 0.2 (L) 0.8 - 3.5 K/UL    ABS.  MONOCYTES 0.1 0 - 1.0 K/UL    RBC COMMENTS NORMOCYTIC, NORMOCHROMIC      DF MANUAL      PLATELET COMMENTS CLUMPED PLATELETS      DIFFERENTIAL MANUAL DIFFERENTIAL ORDERED     METABOLIC PANEL, COMPREHENSIVE    Collection Time: 05/14/17 10:45 AM   Result Value Ref Range    Sodium 139 136 - 145 mmol/L    Potassium 3.1 (L) 3.5 - 5.5 mmol/L    Chloride 105 100 - 108 mmol/L    CO2 23 21 - 32 mmol/L    Anion gap 11 3.0 - 18 mmol/L    Glucose 98 74 - 99 mg/dL    BUN 6 (L) 7.0 - 18 MG/DL    Creatinine 0.85 0.6 - 1.3 MG/DL    BUN/Creatinine ratio 7 (L) 12 - 20      GFR est AA >60 >60 ml/min/1.73m2    GFR est non-AA >60 >60 ml/min/1.73m2    Calcium 7.8 (L) 8.5 - 10.1 MG/DL    Bilirubin, total 0.8 0.2 - 1.0 MG/DL    ALT (SGPT) 5 (L) 13 - 56 U/L    AST (SGOT) 24 15 - 37 U/L    Alk. phosphatase 145 (H) 45 - 117 U/L    Protein, total 5.1 (L) 6.4 - 8.2 g/dL    Albumin 1.9 (L) 3.4 - 5.0 g/dL    Globulin 3.2 2.0 - 4.0 g/dL    A-G Ratio 0.6 (L) 0.8 - 1.7     SED RATE (ESR)    Collection Time: 05/14/17 10:45 AM   Result Value Ref Range    Sed rate, automated 44 (H) 0 - 20 mm/hr   POC G3    Collection Time: 05/14/17 10:45 AM   Result Value Ref Range    Device: NASAL CANNULA      Flow rate (POC) 6 L/M    FIO2 (POC) 44 %    pH (POC) 7.428 7.35 - 7.45      pCO2 (POC) 28.8 (LL) 35 - 45 MMHG    pO2 (POC) 198 (H) 80 - 100 MMHG    HCO3 (POC) 18.6 (L) 22 - 26 MMOL/L    sO2 (POC) 100 (H) 92 - 97 %    Base deficit (POC) 5 mmol/L    Allens test (POC) YES      Site RIGHT RADIAL      Patient temp.  102.9      Specimen type (POC) ARTERIAL      Performed by Chaitanya Reaves    EKG, 12 LEAD, INITIAL    Collection Time: 05/14/17 10:46 AM   Result Value Ref Range    Ventricular Rate 138 BPM    Atrial Rate 138 BPM    P-R Interval 128 ms    QRS Duration 74 ms    Q-T Interval 270 ms    QTC Calculation (Bezet) 409 ms    Calculated P Axis 28 degrees    Calculated R Axis 53 degrees    Diagnosis       Sinus tachycardia  T wave abnormality, consider inferior ischemia  Abnormal ECG  No previous ECGs available     LACTIC ACID, PLASMA    Collection Time: 05/14/17 11:15 AM   Result Value Ref Range    Lactic acid 2.1 (HH) 0.4 - 2.0 MMOL/L   LACTIC ACID, PLASMA    Collection Time: 05/14/17  3:22 PM   Result Value Ref Range    Lactic acid 2.5 (HH) 0.4 - 2.0 MMOL/L   URINALYSIS W/ RFLX MICROSCOPIC    Collection Time: 05/14/17  9:30 PM   Result Value Ref Range    Color LIGHT RED     Appearance CLOUDY      Specific gravity 1.025 1.005 - 1.030      pH (UA) 6.0 5.0 - 8.0      Protein 30 (A) NEG mg/dL    Glucose NEGATIVE  NEG mg/dL    Ketone NEGATIVE  NEG mg/dL    Bilirubin NEGATIVE  NEG      Blood LARGE (A) NEG      Urobilinogen 1.0 0.2 - 1.0 EU/dL    Nitrites NEGATIVE  NEG      Leukocyte Esterase MODERATE (A) NEG     URINE MICROSCOPIC ONLY    Collection Time: 05/14/17  9:30 PM   Result Value Ref Range    WBC 50 to 75 0 - 5 /hpf    RBC >100 (H) 0 - 5 /hpf    Epithelial cells 10 to 12 0 - 5 /lpf    Bacteria FEW (A) NEG /hpf   LACTIC ACID, PLASMA    Collection Time: 05/14/17 10:20 PM   Result Value Ref Range    Lactic acid 2.0 0.4 - 2.0 MMOL/L   METABOLIC PANEL, COMPREHENSIVE    Collection Time: 05/14/17 10:20 PM   Result Value Ref Range    Sodium 140 136 - 145 mmol/L    Potassium 3.0 (L) 3.5 - 5.5 mmol/L    Chloride 108 100 - 108 mmol/L    CO2 23 21 - 32 mmol/L    Anion gap 9 3.0 - 18 mmol/L    Glucose 100 (H) 74 - 99 mg/dL    BUN 7 7.0 - 18 MG/DL    Creatinine 0.95 0.6 - 1.3 MG/DL    BUN/Creatinine ratio 7 (L) 12 - 20      GFR est AA >60 >60 ml/min/1.73m2    GFR est non-AA >60 >60 ml/min/1.73m2    Calcium 7.2 (L) 8.5 - 10.1 MG/DL    Bilirubin, total 0.6 0.2 - 1.0 MG/DL    ALT (SGPT) 11 (L) 13 - 56 U/L    AST (SGOT) 21 15 - 37 U/L    Alk.  phosphatase 88 45 - 117 U/L    Protein, total 4.3 (L) 6.4 - 8.2 g/dL    Albumin 1.5 (L) 3.4 - 5.0 g/dL    Globulin 2.8 2.0 - 4.0 g/dL    A-G Ratio 0.5 (L) 0.8 - 1.7     CBC WITH AUTOMATED DIFF    Collection Time: 05/14/17 10:20 PM   Result Value Ref Range    WBC 20.7 (H) 4.6 - 13.2 K/uL    RBC 3.28 (L) 4.20 - 5.30 M/uL    HGB 9.3 (L) 12.0 - 16.0 g/dL    HCT 27.5 (L) 35.0 - 45.0 %    MCV 83.8 74.0 - 97.0 FL    MCH 28.4 24.0 - 34.0 PG    MCHC 33.8 31.0 - 37.0 g/dL    RDW 14.4 11.6 - 14.5 %    PLATELET 665 027 - 813 K/uL    MPV 10.3 9.2 - 11.8 FL    NEUTROPHILS 54 42 - 75 %    BAND NEUTROPHILS 42 (H) 0 - 5 %    LYMPHOCYTES 1 (L) 20 - 51 %    MONOCYTES 0 (L) 2 - 9 %    EOSINOPHILS 0 0 - 5 %    BASOPHILS 0 0 - 3 %    METAMYELOCYTES 3 (H) 0 %    ABS. NEUTROPHILS 11.2 (H) 1.8 - 8.0 K/UL    ABS. LYMPHOCYTES 0.2 (L) 0.8 - 3.5 K/UL    ABS. MONOCYTES 0.0 0 - 1.0 K/UL    ABS. EOSINOPHILS 0.0 0.0 - 0.4 K/UL    ABS. BASOPHILS 0.0 0.0 - 0.1 K/UL    RBC COMMENTS NORMOCYTIC, NORMOCHROMIC      WBC COMMENTS VACUOLATED POLYS      DF MANUAL     CULTURE, BLOOD    Collection Time: 05/14/17 10:25 PM   Result Value Ref Range    Special Requests: NO SPECIAL REQUESTS      Culture result: NO GROWTH AFTER 9 HOURS     LACTIC ACID, PLASMA    Collection Time: 05/15/17  2:32 AM   Result Value Ref Range    Lactic acid 1.8 0.4 - 2.0 MMOL/L   CBC WITH AUTOMATED DIFF    Collection Time: 05/15/17  5:40 AM   Result Value Ref Range    WBC 18.7 (H) 4.6 - 13.2 K/uL    RBC 3.18 (L) 4.20 - 5.30 M/uL    HGB 9.1 (L) 12.0 - 16.0 g/dL    HCT 26.7 (L) 35.0 - 45.0 %    MCV 84.0 74.0 - 97.0 FL    MCH 28.6 24.0 - 34.0 PG    MCHC 34.1 31.0 - 37.0 g/dL    RDW 14.6 (H) 11.6 - 14.5 %    PLATELET 356 056 - 477 K/uL    MPV 10.0 9.2 - 11.8 FL    NEUTROPHILS 49 42 - 75 %    BAND NEUTROPHILS 47 (H) 0 - 5 %    LYMPHOCYTES 0 (L) 20 - 51 %    MONOCYTES 1 (L) 2 - 9 %    EOSINOPHILS 0 0 - 5 %    BASOPHILS 0 0 - 3 %    METAMYELOCYTES 3 (H) 0 %    ABS. NEUTROPHILS 9.2 (H) 1.8 - 8.0 K/UL    ABS. LYMPHOCYTES 0.0 (L) 0.8 - 3.5 K/UL    ABS. MONOCYTES 0.2 0 - 1.0 K/UL    ABS. EOSINOPHILS 0.0 0.0 - 0.4 K/UL    ABS.  BASOPHILS 0.0 0.0 - 0.1 K/UL    RBC COMMENTS NORMOCYTIC, NORMOCHROMIC      WBC COMMENTS VACUOLATED POLYS      DF MANUAL     METABOLIC PANEL, BASIC    Collection Time: 05/15/17  5:40 AM   Result Value Ref Range    Sodium 141 136 - 145 mmol/L    Potassium 3.8 3.5 - 5.5 mmol/L    Chloride 110 (H) 100 - 108 mmol/L    CO2 20 (L) 21 - 32 mmol/L    Anion gap 11 3.0 - 18 mmol/L    Glucose 126 (H) 74 - 99 mg/dL    BUN 10 7.0 - 18 MG/DL    Creatinine 0.80 0.6 - 1.3 MG/DL    BUN/Creatinine ratio 13 12 - 20      GFR est AA >60 >60 ml/min/1.73m2    GFR est non-AA >60 >60 ml/min/1.73m2    Calcium 7.6 (L) 8.5 - 10.1 MG/DL   MAGNESIUM    Collection Time: 05/15/17  9:31 AM   Result Value Ref Range    Magnesium 1.1 (L) 1.6 - 2.6 mg/dL

## 2017-05-16 LAB
ANION GAP BLD CALC-SCNC: 7 MMOL/L (ref 3–18)
BACTERIA SPEC CULT: NORMAL
BASOPHILS # BLD AUTO: 0 K/UL (ref 0–0.1)
BASOPHILS # BLD: 0 % (ref 0–3)
BUN SERPL-MCNC: 13 MG/DL (ref 7–18)
BUN/CREAT SERPL: 15 (ref 12–20)
CALCIUM SERPL-MCNC: 7.7 MG/DL (ref 8.5–10.1)
CHLORIDE SERPL-SCNC: 110 MMOL/L (ref 100–108)
CO2 SERPL-SCNC: 23 MMOL/L (ref 21–32)
CREAT SERPL-MCNC: 0.88 MG/DL (ref 0.6–1.3)
DIFFERENTIAL METHOD BLD: ABNORMAL
EOSINOPHIL # BLD: 0.7 K/UL (ref 0–0.4)
EOSINOPHIL NFR BLD: 4 % (ref 0–5)
ERYTHROCYTE [DISTWIDTH] IN BLOOD BY AUTOMATED COUNT: 14.7 % (ref 11.6–14.5)
GLUCOSE SERPL-MCNC: 88 MG/DL (ref 74–99)
HCT VFR BLD AUTO: 27.2 % (ref 35–45)
HGB BLD-MCNC: 9.3 G/DL (ref 12–16)
LYMPHOCYTES # BLD AUTO: 9 % (ref 20–51)
LYMPHOCYTES # BLD: 1.6 K/UL (ref 0.8–3.5)
MAGNESIUM SERPL-MCNC: 1.7 MG/DL (ref 1.6–2.6)
MCH RBC QN AUTO: 28.6 PG (ref 24–34)
MCHC RBC AUTO-ENTMCNC: 34.2 G/DL (ref 31–37)
MCV RBC AUTO: 83.7 FL (ref 74–97)
METAMYELOCYTES NFR BLD MANUAL: 1 %
MONOCYTES # BLD: 0.2 K/UL (ref 0–1)
MONOCYTES NFR BLD AUTO: 1 % (ref 2–9)
NEUTS BAND NFR BLD MANUAL: 33 % (ref 0–5)
NEUTS SEG # BLD: 9.3 K/UL (ref 1.8–8)
NEUTS SEG NFR BLD AUTO: 52 % (ref 42–75)
PLATELET # BLD AUTO: 153 K/UL (ref 135–420)
PLATELET COMMENTS,PCOM: ABNORMAL
PMV BLD AUTO: 10.6 FL (ref 9.2–11.8)
POTASSIUM SERPL-SCNC: 3.8 MMOL/L (ref 3.5–5.5)
RBC # BLD AUTO: 3.25 M/UL (ref 4.2–5.3)
RBC MORPH BLD: ABNORMAL
SERVICE CMNT-IMP: NORMAL
SODIUM SERPL-SCNC: 140 MMOL/L (ref 136–145)
WBC # BLD AUTO: 17.9 K/UL (ref 4.6–13.2)
WBC MORPH BLD: ABNORMAL

## 2017-05-16 PROCEDURE — 36415 COLL VENOUS BLD VENIPUNCTURE: CPT | Performed by: FAMILY MEDICINE

## 2017-05-16 PROCEDURE — 74011250636 HC RX REV CODE- 250/636: Performed by: FAMILY MEDICINE

## 2017-05-16 PROCEDURE — 74011250636 HC RX REV CODE- 250/636: Performed by: INTERNAL MEDICINE

## 2017-05-16 PROCEDURE — 85025 COMPLETE CBC W/AUTO DIFF WBC: CPT | Performed by: FAMILY MEDICINE

## 2017-05-16 PROCEDURE — 83735 ASSAY OF MAGNESIUM: CPT | Performed by: INTERNAL MEDICINE

## 2017-05-16 PROCEDURE — 65270000029 HC RM PRIVATE

## 2017-05-16 PROCEDURE — 74011250637 HC RX REV CODE- 250/637: Performed by: OBSTETRICS & GYNECOLOGY

## 2017-05-16 PROCEDURE — 74011250636 HC RX REV CODE- 250/636: Performed by: HOSPITALIST

## 2017-05-16 PROCEDURE — 74011000258 HC RX REV CODE- 258: Performed by: FAMILY MEDICINE

## 2017-05-16 PROCEDURE — 74011250637 HC RX REV CODE- 250/637: Performed by: INTERNAL MEDICINE

## 2017-05-16 PROCEDURE — 80048 BASIC METABOLIC PNL TOTAL CA: CPT | Performed by: FAMILY MEDICINE

## 2017-05-16 RX ORDER — METOCLOPRAMIDE 10 MG/1
10 TABLET ORAL
Status: DISCONTINUED | OUTPATIENT
Start: 2017-05-16 | End: 2017-05-19

## 2017-05-16 RX ORDER — MAGNESIUM SULFATE HEPTAHYDRATE 40 MG/ML
2 INJECTION, SOLUTION INTRAVENOUS ONCE
Status: COMPLETED | OUTPATIENT
Start: 2017-05-16 | End: 2017-05-16

## 2017-05-16 RX ADMIN — Medication 400 MG: at 21:59

## 2017-05-16 RX ADMIN — PIPERACILLIN SODIUM,TAZOBACTAM SODIUM 3.38 G: 3; .375 INJECTION, POWDER, FOR SOLUTION INTRAVENOUS at 18:33

## 2017-05-16 RX ADMIN — PIPERACILLIN SODIUM,TAZOBACTAM SODIUM 3.38 G: 3; .375 INJECTION, POWDER, FOR SOLUTION INTRAVENOUS at 12:40

## 2017-05-16 RX ADMIN — OXYCODONE HYDROCHLORIDE AND ACETAMINOPHEN 2 TABLET: 5; 325 TABLET ORAL at 15:05

## 2017-05-16 RX ADMIN — Medication 400 MG: at 07:30

## 2017-05-16 RX ADMIN — DOCUSATE SODIUM AND SENNOSIDES 1 TABLET: 8.6; 5 TABLET, FILM COATED ORAL at 11:24

## 2017-05-16 RX ADMIN — HEPARIN SODIUM 5000 UNITS: 5000 INJECTION, SOLUTION INTRAVENOUS; SUBCUTANEOUS at 03:07

## 2017-05-16 RX ADMIN — METOCLOPRAMIDE 10 MG: 10 TABLET ORAL at 12:39

## 2017-05-16 RX ADMIN — LEVOFLOXACIN 750 MG: 5 INJECTION, SOLUTION INTRAVENOUS at 13:15

## 2017-05-16 RX ADMIN — Medication 1 TABLET: at 07:32

## 2017-05-16 RX ADMIN — HEPARIN SODIUM 5000 UNITS: 5000 INJECTION, SOLUTION INTRAVENOUS; SUBCUTANEOUS at 11:22

## 2017-05-16 RX ADMIN — MAGNESIUM SULFATE HEPTAHYDRATE 2 G: 40 INJECTION, SOLUTION INTRAVENOUS at 09:16

## 2017-05-16 RX ADMIN — SODIUM CHLORIDE, SODIUM LACTATE, POTASSIUM CHLORIDE, AND CALCIUM CHLORIDE 150 ML/HR: 600; 310; 30; 20 INJECTION, SOLUTION INTRAVENOUS at 03:11

## 2017-05-16 RX ADMIN — HEPARIN SODIUM 5000 UNITS: 5000 INJECTION, SOLUTION INTRAVENOUS; SUBCUTANEOUS at 19:18

## 2017-05-16 RX ADMIN — METOCLOPRAMIDE 10 MG: 10 TABLET ORAL at 17:17

## 2017-05-16 RX ADMIN — METOCLOPRAMIDE 10 MG: 10 TABLET ORAL at 21:59

## 2017-05-16 RX ADMIN — PIPERACILLIN SODIUM,TAZOBACTAM SODIUM 3.38 G: 3; .375 INJECTION, POWDER, FOR SOLUTION INTRAVENOUS at 05:55

## 2017-05-16 RX ADMIN — IBUPROFEN 800 MG: 400 TABLET ORAL at 16:08

## 2017-05-16 RX ADMIN — IBUPROFEN 800 MG: 400 TABLET ORAL at 07:30

## 2017-05-16 NOTE — LACTATION NOTE
Mom returned from [address] [place] [address] feeding at breast since admit to unit. Mom to use formula prn pc (up to 15 mls) --reviewed paced bottle feeding. Encouraged to attend [facility] BF support group.

## 2017-05-16 NOTE — PROGRESS NOTES
@1930 Pt taken over awake alert oriented x4 in bed on room air spo2 97%. Denies pain at present. Vital signs stable HR fluctuates. Minimal drainage on marternity pads. Assessment done and documented in appropriate flow sheets . Nursing management continues. @9227 pt verbalized that she was having some lower back pain and abdominal cramps, pt assisted with repositioning no relief analgesia administered as prescribed. Nursing observation continues. @2139 pain has onlt slightly improved pt requested motrin same administered care continues. Relatives at bedside at this time. @2200 Pt verbalized that pain is much improved ,vital signs WNL nursing management continues. @0000 pt voided and sitz bath done. Mother baby nurse assessed pt. Denies pain at this time. Deandra; signs remains stable. Nursing care continues. @0200 no new developments observation continues. @0400 pt asleep in no obvious distress nursing management continues. @3625 sitz bath done, denies pain at this time care continues. @6506 Bedside and Verbal shift change report given to Mi Miller (oncoming nurse) by William Benito (offgoing nurse). Report included the following information SBAR, Kardex, Intake/Output, MAR, Recent Results and Med Rec Status.    Alarm parameters reviewed, on and audible Appropriate for patient clinical condition

## 2017-05-16 NOTE — PROGRESS NOTES
0730--Patient c/o back pain 6/10. Medicated with 800mg PO Motrin. 0830--PAIN REASSESSMENT: 3/10.     1009--[facility] Patient okay to transfer to [address] [place] [address]. 1055--Patient transported to Mother-Baby via wheelchair and patient transport.

## 2017-05-16 NOTE — PROGRESS NOTES
Verbal report received from [facility]. 15 on [employer]   being received from ICU for routine progression of care      Report consisted of patients Situation, Background, Assessment and   Recommendations(SBAR). Information from the following report(s) SBAR, Kardex, Procedure Summary and MAR was reviewed with the receiving nurse. Opportunity for questions and clarification was provided. Patient will arrive by W/C with tranportation. 1055- Patient arrived in unit. Vital signs and assessment done.

## 2017-05-16 NOTE — ROUTINE PROCESS
Bedside and Verbal shift change report given to Caitlin Allison RN (oncoming nurse) by Joelle Acuna RN (offgoing nurse).  Report included the following information SBAR, Kardex, Intake/Output, MAR, Recent Results, Med Rec Status and Cardiac Rhythm SR.

## 2017-05-16 NOTE — PROGRESS NOTES
[facility]  Pulmonary, Critical Care, and Sleep Medicine    Name: Madelaine Vásquez MRN: [id number]   : 1985 Hospital: [facility] [place]   Date: 2017        Critical Care Follow Up                                                [x]I have reviewed the flowsheet and previous days notes. Events, vitals, medications and notes from last 24 hours reviewed. Care plan discussed with staff, patient, family and on multidisciplinary rounds. IMPRESSION:   · Sepsis, possible source urine vs. Endometrium  · UTI, suspect CAUTI, catheter removed  · Normal spontaneous vaginal delivery  · Dyspnea likely due to above, negative CTA  · Anemia due to above  · Constipation,  Patient does not want laxative  · Code status: Full code      RECOMMENDATIONS:   · CVS:  Will give additional LR bolus. · Respiratory:   continue ICS,   · ID:  Continue abx   Follow up on urine and blood culture  · Hematology/Oncology:  No need for transfusion  · Renal:  Renal function stable  · GI/:  No need for morris  · Endocrine:   ssi  · Neurology/Pain/Sedation:  Prn pain meds  · Musculoskeletal:  No issues  · Skin/Wound: No issues  · FEN: Replete lytes  ·   · Prophylaxis: GI Prophylaxis not needed  DVT Prophylaxis with heparin  ·                                                               ·  PT/OT eval and treat. OOB when appropriate. · Further recommendations will be based on the patient's response to recommended treatment and results of the investigation ordered. Subjective/History of Present Illness:     Madelaine Vásquez has been seen and evaluated in ICU/   Patient is a 36 y.o. female with PMHx listed below. S/p normal vaginal deliver after estimated gestational age of 37weeks. Post partum  Patient developed fever, dyspnea and leukocystosis with elevated lactate. Was transferred to ICU for intensive care. This am feeling much better. I have stopped the nasal cannula. Pain tolerable.   No dysuria. WBC slight improvement. Afebrile. No chest pain or sob. Wants to move up to ob floor,  HD stable. Not on any pressors. No BM. Pt refused laxative. 5  HR down,  Off oxygen. No pain. Denies sob or wheezing. Slept well. Review of Systems:  A comprehensive review of systems was negative except for that written in the HPI. No Known Allergies   Past Medical History:   Diagnosis Date    Anemia due to acute blood loss 2017      Current Facility-Administered Medications   Medication Dose Route Frequency    magnesium sulfate 2 g/50 ml IVPB (premix or compounded)  2 g IntraVENous ONCE    heparin (porcine) injection 5,000 Units  5,000 Units SubCUTAneous Q8H    magnesium oxide (MAG-OX) tablet 400 mg  400 mg Oral BID    piperacillin-tazobactam (ZOSYN) 3.375 g in 0.9% sodium chloride (MBP/ADV) 100 mL MBP  3.375 g IntraVENous Q6H    levoFLOXacin (LEVAQUIN) 750 mg in D5W IVPB  750 mg IntraVENous Q24H    prenatal vit-calcium-iron-fa (PRENATAL PLUS with CALCIUM) tablet 1 Tab  1 Tab Oral DAILY       Lines: All central lines examined by me. No signs of erythema, induration, discharge.         Peripheral Intravenous Line:  Peripheral IV 17 Right Wrist (Active)   Site Assessment Clean, dry, & intact 5/15/2017  8:07 AM   Phlebitis Assessment 0 5/15/2017  8:07 AM   Infiltration Assessment 0 5/15/2017  8:07 AM   Dressing Status Clean, dry, & intact 5/15/2017  8:07 AM   Dressing Type Transparent 5/15/2017  8:07 AM   Hub Color/Line Status Infusing 5/15/2017  8:07 AM   Action Taken Open ports on tubing capped 5/15/2017  8:07 AM   Alcohol Cap Used Yes 5/15/2017  8:07 AM            Objective:   Vital Signs:    Visit Vitals    BP (!) 131/91    Pulse 95    Temp 98 °F (36.7 °C)    Resp 22    Ht 5' 7\" (1.702 m)    Wt 80.1 kg (176 lb 9.4 oz)    SpO2 96%    Breastfeeding Yes    BMI 27.66 kg/m2       O2 Device: Room air       Temp (24hrs), Av.2 °F (36.8 °C), Min:98 °F (36.7 °C), Max:98.5 °F (36.9 °C)       Intake/Output:   Last shift:           Last 3 shifts: 05/14 1901 - 05/16 0700  In: 3622.1 [P.O.:840; I.V.:2782.1]  Out: 4775 [Urine:4775]      Intake/Output Summary (Last 24 hours) at 05/16/17 0850  Last data filed at 05/16/17 9859   Gross per 24 hour   Intake           1942.5 ml   Output             3525 ml   Net          -1582.5 ml       Last 3 Recorded Weights in this Encounter    05/12/17 1840 05/14/17 1256 05/15/17 2001   Weight: 77.1 kg (170 lb) 80.1 kg (176 lb 9.4 oz) 80.1 kg (176 lb 9.4 oz)         [facility]DS network Guidelines: Lung protective strategy and Plateau pressure goals less than or equal to 30    Telemetry:normal sinus rhythm    Physical Exam:     General: Awake, NAD  Head: atraumatic, normocephalic  Eye: PERRLA, no scleral icterus, no pallor, no cyanosis  Nose: no sinus tenderness, no erythema, no induration, no discharge  Throat: no tonsillar enlargement, no erythema, no exudates, no oral thrush  Neck: supple, no thyromegaly, no JVD, no lymphadenopathy.  Trachea midline  Lung: adequate air entry bilateral equal, no rales, no rhonchi, no wheezing  Heart: S1 S2 present, no murmur, no gallop  Abdomen: soft, NT, ND, +BS  LE: no pedal edema, no cyanosis, no clubbing, 2+ peripheral pulses in DP  Lymphatic: no cervical/supraclavicular/axillary lymphadenopathy  Neurologic: no focal deficit      Data:         Chemistry Recent Labs      05/16/17   0410  05/15/17   0931  05/15/17   0540  05/14/17   2220  05/14/17   1045   GLU  88   --   126*  100*  98   NA  140   --   141  140  139   K  3.8   --   3.8  3.0*  3.1*   CL  110*   --   110*  108  105   CO2  23   --   20*  23  23   BUN  13   --   10  7  6*   CREA  0.88   --   0.80  0.95  0.85   CA  7.7*   --   7.6*  7.2*  7.8*   MG  1.7  1.1*   --    --    --    AGAP  7   --   11  9  11   BUCR  15   --   13  7*  7*   AP   --    --    --   88  145*   TP   --    --    --   4.3*  5.1*   ALB   --    --    --   1.5*  1.9*   GLOB   --    --    --   2.8 3. 2   AGRAT   --    --    --   0.5*  0.6*        Lactic Acid Lactic acid   Date Value Ref Range Status   05/15/2017 1.8 0.4 - 2.0 MMOL/L Final     Recent Labs      05/15/17   0232  05/14/17   2220  05/14/17   1522   LAC  1.8  2.0  2.5*        Liver Enzymes Protein, total   Date Value Ref Range Status   05/14/2017 4.3 (L) 6.4 - 8.2 g/dL Final     Albumin   Date Value Ref Range Status   05/14/2017 1.5 (L) 3.4 - 5.0 g/dL Final     Globulin   Date Value Ref Range Status   05/14/2017 2.8 2.0 - 4.0 g/dL Final     A-G Ratio   Date Value Ref Range Status   05/14/2017 0.5 (L) 0.8 - 1.7   Final     AST (SGOT)   Date Value Ref Range Status   05/14/2017 21 15 - 37 U/L Final     Alk. phosphatase   Date Value Ref Range Status   05/14/2017 88 45 - 117 U/L Final     Recent Labs      05/14/17   2220  05/14/17   1045   TP  4.3*  5.1*   ALB  1.5*  1.9*   GLOB  2.8  3.2   AGRAT  0.5*  0.6*   SGOT  21  24   AP  88  145*        CBC w/Diff Recent Labs      05/16/17   0410  05/15/17   0540  05/14/17   2220   WBC  17.9*  18.7*  20.7*   RBC  3.25*  3.18*  3.28*   HGB  9.3*  9.1*  9.3*   HCT  27.2*  26.7*  27.5*   PLT  153  152  159   GRANS  52  49  54   LYMPH  9*  0*  1*   EOS  4  0  0        Cardiac Enzymes No results found for: CPK, CKMMB, CKMB, RCK3, CKMBT, CKNDX, CKND1, MICHELLE, TROPT, TROIQ, DAISY, TROPT, TNIPOC, BNP, BNPP     BNP No results found for: BNP, BNPP, XBNPT     Coagulation No results for input(s): PTP, INR, APTT in the last 72 hours.     No lab exists for component: INREXT, INREXT      Thyroid  Lab Results   Component Value Date/Time    TSH 2.13 11/23/2009 11:05 AM          Lipid Panel No results found for: CHOL, CHOLPOCT, CHOLX, CHLST, CHOLV, 227240, HDL, LDL, NLDLCT, DLDL, LDLC, DLDLP, 752716, VLDLC, VLDL, TGL, TGLX, TRIGL, [id number], TRIGP, TGLPOCT, 428622, CHHD, CHHDX     ABG Recent Labs      05/14/17   1045   PHI  7.428   PCO2I  28.8*   PO2I  198*   HCO3I  18.6*   FIO2I  44        Urinalysis Lab Results   Component Value Date/Time    Color LIGHT RED 05/14/2017 09:30 PM    Appearance CLOUDY 05/14/2017 09:30 PM    Specific gravity 1.025 05/14/2017 09:30 PM    pH (UA) 6.0 05/14/2017 09:30 PM    Protein 30 05/14/2017 09:30 PM    Glucose NEGATIVE  05/14/2017 09:30 PM    Ketone NEGATIVE  05/14/2017 09:30 PM    Bilirubin NEGATIVE  05/14/2017 09:30 PM    Urobilinogen 1.0 05/14/2017 09:30 PM    Nitrites NEGATIVE  05/14/2017 09:30 PM    Leukocyte Esterase MODERATE 05/14/2017 09:30 PM    Epithelial cells 10 to 12 05/14/2017 09:30 PM    Bacteria FEW 05/14/2017 09:30 PM    WBC 50 to 75 05/14/2017 09:30 PM    RBC >100 05/14/2017 09:30 PM        Micro  Recent Labs      05/14/17   2225  05/14/17   1030   CULT  NO GROWTH 2 DAYS  NO GROWTH 2 DAYS     Recent Labs      05/14/17   2225  05/14/17   1030   CULT  NO GROWTH 2 DAYS  NO GROWTH 2 DAYS        XR (Most Recent). CXR reviewed by me and compared with previous CXR   Results from Hospital Encounter encounter on 05/12/17   XR CHEST PORT   Narrative Indication:  Shortness of breath, fever    Comparison:  None    Time of study - 1026    Findings:  AP erect portable chest    The cardiomediastinal silhouette is normal.  The lungs are clear. The bony  structures appear unremarkable. Impression IMPRESSION:     Negative portable chest.                     CT (Most Recent)   Results from Hospital Encounter encounter on 05/12/17   CTA CHEST ABD PELV W WO CONT   Narrative Indication:  Shortness of breath, hypoxemia, fever, right-sided abdominal pain    Technique:  Volumetric scanning of the thorax, abdomen and pelvis with IV  contrast.  Thin overlapping coronal and sagittal MIP arteriograms were  reconstructed in order to properly assess vascular anatomy. Dose reduction techniques used: Automated exposure control, adjustment of the  mAs and/or kVp according to patient's size, and/or iterative reconstruction  techniques. Techniques utilized are listed in the medical record.     Comparison:  CT abdomen and pelvis from 01/04/11    Findings:    Lungs: Unremarkable. There is no evidence of pulmonary embolism. Mediastinum: Normal appearance showing no masses to suggest malignancy, either  primary or metastatic. The aorta shows no evidence of dissection. Heart: Normal in appearance. Pleura: Normal.    Chest wall: Normal in appearance. Liver: Normal in appearance. Spleen:  Normal.    Pancreas:  Normal.    Gallbladder and bile ducts:  Normal.    Adrenals:  Normal.    Kidneys:  Normal.    Aorta and Vascular Structures:  Normal.    Retroperitoneum:  Normal.    Bowel and Mesentery:  Normal.    Osseous structures: Unremarkable. Bladder:  Normal.    Bowel:  Normal.    Genitourinary tract: The uterus is enlarged compatible with the patient's  postpartum state. Peritoneal cavity:  Normal.           Impression IMPRESSION:    Enlarged uterus compatible with postpartum state    No acute abnormality demonstrated                   EKG No results found for this or any previous visit. ECHO No results found for this or any previous visit. PFT No flowsheet data found.      Other ASA reactivity:   Pre-albumin:   Ionized Calcium:   NH4:   T3, FT4:  Cortisol:  Urine Osm:  Urine Lytes:   HbA1c:      Recent Results (from the past 24 hour(s))   MAGNESIUM    Collection Time: 05/15/17  9:31 AM   Result Value Ref Range    Magnesium 1.1 (L) 1.6 - 2.6 mg/dL   CBC WITH AUTOMATED DIFF    Collection Time: 05/16/17  4:10 AM   Result Value Ref Range    WBC 17.9 (H) 4.6 - 13.2 K/uL    RBC 3.25 (L) 4.20 - 5.30 M/uL    HGB 9.3 (L) 12.0 - 16.0 g/dL    HCT 27.2 (L) 35.0 - 45.0 %    MCV 83.7 74.0 - 97.0 FL    MCH 28.6 24.0 - 34.0 PG    MCHC 34.2 31.0 - 37.0 g/dL    RDW 14.7 (H) 11.6 - 14.5 %    PLATELET 244 019 - 117 K/uL    MPV 10.6 9.2 - 11.8 FL    NEUTROPHILS 52 42 - 75 %    BAND NEUTROPHILS 33 (H) 0 - 5 %    LYMPHOCYTES 9 (L) 20 - 51 %    MONOCYTES 1 (L) 2 - 9 %    EOSINOPHILS 4 0 - 5 %    BASOPHILS 0 0 - 3 % METAMYELOCYTES 1 (H) 0 %    ABS. NEUTROPHILS 9.3 (H) 1.8 - 8.0 K/UL    ABS. LYMPHOCYTES 1.6 0.8 - 3.5 K/UL    ABS. MONOCYTES 0.2 0 - 1.0 K/UL    ABS. EOSINOPHILS 0.7 (H) 0.0 - 0.4 K/UL    ABS. BASOPHILS 0.0 0.0 - 0.1 K/UL    PLATELET COMMENTS FEW LARGE PLATELETS     RBC COMMENTS NORMOCYTIC, NORMOCHROMIC      WBC COMMENTS TOXIC GRANULATION      DF MANUAL     METABOLIC PANEL, BASIC    Collection Time: 05/16/17  4:10 AM   Result Value Ref Range    Sodium 140 136 - 145 mmol/L    Potassium 3.8 3.5 - 5.5 mmol/L    Chloride 110 (H) 100 - 108 mmol/L    CO2 23 21 - 32 mmol/L    Anion gap 7 3.0 - 18 mmol/L    Glucose 88 74 - 99 mg/dL    BUN 13 7.0 - 18 MG/DL    Creatinine 0.88 0.6 - 1.3 MG/DL    BUN/Creatinine ratio 15 12 - 20      GFR est AA >60 >60 ml/min/1.73m2    GFR est non-AA >60 >60 ml/min/1.73m2    Calcium 7.7 (L) 8.5 - 10.1 MG/DL   MAGNESIUM    Collection Time: 05/16/17  4:10 AM   Result Value Ref Range    Magnesium 1.7 1.6 - 2.6 mg/dL         Best practice : All below core measures reviewed and addressed:   [x] Antibiotic choice. [x] Severe Sepsis bundles follwed; SIRS screen met? Yes       [x] Fluids. [x] Lactic acid ordered- initial and repeat Q6hrs if elevated till normalized. [x] Cultures drawn. [x] Antibiotic administered within 1hr-ICU and 3hrs ED. [x] Large bore IV- 2 sites           [] Pressors aim MAP >65mmHg.  [] Steroids. [x] Glycemic control. [x] Infection control. [] Mech. Ventilated patients- aim to keep peak plateau pressure 82-89ZB H2O.  [] HOB at >= 30 degree. [] Stress ulcer prophylaxis. [x] DVT prophylaxis. [] Central Line Bundle Followed. [] Boateng Bundle Followed. [] Ventilator Bundle Followed.  (Daily sedation holiday to assess readiness for weaning from ventilator & SBT trial starting at 6.00 am, HOB 30-degree elevation, Chlorhexidine mouth washes & Routine oral care every 4 hours, Jessica tube to suction at 20-30 cm H2O, Maintain Cloud tube with 5-10ml air every 4 hours, DVT prophylaxis, GI prophylaxis etc.). The patient is: [] acutely ill Risk of deterioration: [x] moderate    [] critically ill  [] high     [x]See my orders for details    My assessment, plan of care, findings, medications, side effects etc were discussed with:  [x] Nurse [x] PT/OT    [] Respiratory therapy []     [x] Family: answered all questions to satisfaction [] Patient: answered all questions to satisfaction   [] Pharmacist []      [] Case & management strategies discussed today on multidisciplinary rounds    High complexity decision making was performed during the evaluation of this patient at high risk for decompensation with multiple organ involvement    [x]Total critical care time spent on reviewing the case/medical record/data/notes/EMR/patient examination/documentation/coordinating care with nurse/consultants, exclusive of procedures 55minutes with complex decision making performed and > 50% time spent in face to face evaluation.       Heidi Strange MD  5/16/2017

## 2017-05-16 NOTE — PROGRESS NOTES
Hospitalist Progress Note-critical care note     Patient: Aline Andres MRN: [id number]  CSN: [id number]    YOB: 1985  Age: 36 y.o. Sex: female    DOA: 5/12/2017 LOS:  LOS: 4 days            Chief complaint: sepsis, hypomagnesemia    Assessment/Plan         Patient Active Problem List   Diagnosis Code    Normal spontaneous vaginal delivery O80    Laceration of labial mucosa without complication I30.559P    Anemia due to acute blood loss D62    Dyspnea R06.00    Sepsis ([address] A41.9    Septic shock (HCC) A41.9, R65.21    Fever R50.9    Endometritis following delivery O86.12    Hypomagnesemia E83.42       1. Acute dyspena:   -resolved  -cta negative for PE,   -cxr: Negative portable chest.  2 Sepsis/sepitc shock  -shock resolved  -sepsis improving with improving wbc-17 today    - possible intraabdominal resource  -continue iv zosyn and levaquin  -cx negative   3. Hypomagnesemia   Mg replacement, will give another 2 g to keep at 2         Subjective : feel fine, sob on exertion, pain in the private area   Nurse: no acute issue     Can be transferred to [facility] floor   Review of systems:    General: No fevers or chills. Cardiovascular: No chest pain or pressure. No palpitations. Pulmonary: No shortness of breath. Gastrointestinal: No nausea, vomiting. Vital signs/Intake and Output:  Visit Vitals    /76 (BP 1 Location: Left arm, BP Patient Position: Post activity)    Pulse 88    Temp 97.6 °F (36.4 °C)    Resp 18    Ht 5' 7\" (1.702 m)    Wt 80.1 kg (176 lb 9.4 oz)    SpO2 93%    Breastfeeding Yes    BMI 27.66 kg/m2     Current Shift:  05/16 0701 - 05/16 1900  In: 259 [P.O.:240; I.V.:515]  Out: 1020 [Urine:1000]  Last three shifts:  05/14 1901 - 05/16 0700  In: 5087.1 [P.O.:840; I.V.:4247.1]  Out: 4571 [Urine:4775]    Physical Exam:  General: WD, WN. Alert, cooperative, no acute distress    HEENT: NC, Atraumatic. PERRLA, anicteric sclerae. Lungs: CTA Bilaterally.  No Wheezing/Rhonchi/Rales. Heart:  rrr ,  No murmur, No Rubs, No Gallops  Abdomen: Soft, Non distended, Non tender.  +Bowel sounds,   Extremities: No c/c/e  Psych:   Not anxious or agitated. Neurologic:  No acute neurological deficit. Labs: Results:       Chemistry Recent Labs      05/16/17   0410  05/15/17   0540  05/14/17   2220  05/14/17   1045   GLU  88  126*  100*  98   NA  140  141  140  139   K  3.8  3.8  3.0*  3.1*   CL  110*  110*  108  105   CO2  23  20*  23  23   BUN  13  10  7  6*   CREA  0.88  0.80  0.95  0.85   CA  7.7*  7.6*  7.2*  7.8*   AGAP  7  11  9  11   BUCR  15  13  7*  7*   AP   --    --   88  145*   TP   --    --   4.3*  5.1*   ALB   --    --   1.5*  1.9*   GLOB   --    --   2.8  3.2   AGRAT   --    --   0.5*  0.6*      CBC w/Diff Recent Labs      05/16/17   0410  05/15/17   0540  05/14/17   2220   WBC  17.9*  18.7*  20.7*   RBC  3.25*  3.18*  3.28*   HGB  9.3*  9.1*  9.3*   HCT  27.2*  26.7*  27.5*   PLT  153  152  159   GRANS  52  49  54   LYMPH  9*  0*  1*   EOS  4  0  0      Cardiac Enzymes No results for input(s): CPK, CKND1, MICHELLE in the last 72 hours. No lab exists for component: CKRMB, TROIP   Coagulation No results for input(s): PTP, INR, APTT in the last 72 hours. No lab exists for component: INREXT, INREXT    Lipid Panel No results found for: CHOL, CHOLPOCT, CHOLX, CHLST, CHOLV, [id number], HDL, LDL, NLDLCT, DLDL, LDLC, DLDLP, 774902, VLDLC, VLDL, TGL, TGLX, TRIGL, [id number], TRIGP, TGLPOCT, [id number], CHHD, CHHDX   BNP No results for input(s): BNPP in the last 72 hours.    Liver Enzymes Recent Labs      05/14/17   2220   TP  4.3*   ALB  1.5*   AP  88   SGOT  21      Thyroid Studies Lab Results   Component Value Date/Time    TSH 2.13 11/23/2009 11:05 AM        Procedures/imaging: see electronic medical records for all procedures/Xrays and details which were not copied into this note but were reviewed prior to creation of Luan Mar MD

## 2017-05-16 NOTE — PROGRESS NOTES
Bedside and Verbal shift change report given to SAMMI Bond RN  by Karolyn Chavarria RN . Report given with Pricilla CONWAY and MAR.

## 2017-05-16 NOTE — PROGRESS NOTES
Progress Note    Patient: Lindsey Boucher MRN: [id number]  SSN: [id number]    YOB: 1985  Age: 36 y.o. Sex: female    ROOM:  102/01      Subjective:     Postpartum Day: 3            Delivery: vaginal delivery    The patient feels well. The patient denies emotional concerns. The baby is well. Baby is feeding via bottle now, attmepting to breast feed. The patient is ambulating well. The patient  tolerating a normal diet. Flatus has been passed.     Objective:      Patient Vitals for the past 24 hrs:   BP Temp Pulse Resp SpO2 Weight   05/16/17 1000 132/86 - 98 20 97 % -   05/16/17 0800 123/84 - 92 25 91 % -   05/16/17 0730 (!) 131/91 - 95 22 96 % -   05/16/17 0720 - 98 °F (36.7 °C) - - - -   05/16/17 0700 129/88 - 91 22 98 % -   05/16/17 0600 (!) 137/95 - 88 25 98 % -   05/16/17 0530 114/78 - 81 19 97 % -   05/16/17 0500 118/78 - 83 20 95 % -   05/16/17 0430 115/69 - 92 18 96 % -   05/16/17 0400 110/77 - 89 21 - -   05/16/17 0330 110/71 - 81 19 95 % -   05/16/17 0300 109/65 - 92 18 (!) 89 % -   05/16/17 0230 121/74 - 88 18 91 % -   05/16/17 0200 119/69 - 94 19 90 % -   05/16/17 0130 110/67 - (!) 108 24 91 % -   05/16/17 0100 117/61 - (!) 102 19 96 % -   05/16/17 0030 109/65 - (!) 116 27 94 % -   05/16/17 0000 109/65 98.5 °F (36.9 °C) (!) 104 20 94 % -   05/15/17 2330 109/65 - (!) 106 20 94 % -   05/15/17 2315 - - (!) 106 23 96 % -   05/15/17 2300 111/74 - 100 23 - -   05/15/17 2230 113/73 - (!) 108 23 96 % -   05/15/17 2200 98/81 - (!) 105 21 99 % -   05/15/17 2130 114/80 - (!) 108 22 100 % -   05/15/17 2100 (!) 134/92 - (!) 106 25 100 % -   05/15/17 2030 123/75 - (!) 112 23 100 % -   05/15/17 2001 - - - - - 80.1 kg (176 lb 9.4 oz)   05/15/17 2000 122/83 - 98 26 98 % -   05/15/17 1930 114/78 - (!) 107 25 100 % -   05/15/17 1900 114/74 - 100 19 99 % -   05/15/17 1833 - 98.1 °F (36.7 °C) - - - -   05/15/17 1800 121/81 - 95 14 100 % -   05/15/17 1700 104/65 - 96 20 97 % -   05/15/17 1630 115/74 - (!) 106 (!) 33 99 % -   05/15/17 1603 - 98.4 °F (36.9 °C) - - - -   05/15/17 1600 94/65 - 100 14 97 % -   05/15/17 1530 99/84 - (!) 107 12 92 % -   05/15/17 1500 106/68 - (!) 101 20 98 % -   05/15/17 1430 107/59 - (!) 105 22 98 % -   05/15/17 1400 116/62 - (!) 111 23 99 % -   05/15/17 1330 109/71 - (!) 107 20 100 % -   05/15/17 1300 118/75 - (!) 118 30 100 % -   05/15/17 1230 131/82 - (!) 121 18 - -   05/15/17 1200 131/79 - (!) 113 21 100 % -   05/15/17 1130 115/76 - (!) 104 24 99 % -   05/15/17 1100 116/78 - (!) 101 21 100 % -   05/15/17 1053 - 98.2 °F (36.8 °C) - - - -     Lochia:  appropriate   Uterine Fundus:   firm, non tender   Fundus Location:  -3   Incision:      DVT Evaluation:  No evidence of DVT seen on physical exam.  Negative Madi's sign. No cords or calf tenderness. No significant calf/ankle edema. Lab/Data Review: All lab results for the last 24 hours reviewed. LABS: Recent Results (from the past 48 hour(s))   CBC WITH MANUAL DIFF    Collection Time: 05/14/17 10:45 AM   Result Value Ref Range    WBC 7.5 4.6 - 13.2 K/uL    RBC 3.77 (L) 4.20 - 5.30 M/uL    HGB 10.7 (L) 12.0 - 16.0 g/dL    HCT 31.7 (L) 35.0 - 45.0 %    MCV 84.1 74.0 - 97.0 FL    MCH 28.4 24.0 - 34.0 PG    MCHC 33.8 31.0 - 37.0 g/dL    RDW 14.3 11.6 - 14.5 %    PLATELET 633 590 - 572 K/uL    MPV 10.7 9.2 - 11.8 FL    NEUTROPHILS 96 (H) 40 - 73 %    BAND NEUTROPHILS 1 0 - 5 %    LYMPHOCYTES 2 (L) 20 - 51 %    MONOCYTES 1 (L) 2 - 9 %    EOSINOPHILS 0 0 - 5 %    BASOPHILS 0 0 - 3 %    METAMYELOCYTES 0 0 %    MYELOCYTES 0 0 %    PROMYELOCYTES 0 0 %    BLASTS 0 0 %    ABS. NEUTROPHILS 7.2 1.8 - 8.0 K/UL    ABS. LYMPHOCYTES 0.2 (L) 0.8 - 3.5 K/UL    ABS.  MONOCYTES 0.1 0 - 1.0 K/UL    RBC COMMENTS NORMOCYTIC, NORMOCHROMIC      DF MANUAL      PLATELET COMMENTS CLUMPED PLATELETS      DIFFERENTIAL MANUAL DIFFERENTIAL ORDERED     METABOLIC PANEL, COMPREHENSIVE    Collection Time: 05/14/17 10:45 AM   Result Value Ref Range    Sodium 139 136 - 145 mmol/L    Potassium 3.1 (L) 3.5 - 5.5 mmol/L    Chloride 105 100 - 108 mmol/L    CO2 23 21 - 32 mmol/L    Anion gap 11 3.0 - 18 mmol/L    Glucose 98 74 - 99 mg/dL    BUN 6 (L) 7.0 - 18 MG/DL    Creatinine 0.85 0.6 - 1.3 MG/DL    BUN/Creatinine ratio 7 (L) 12 - 20      GFR est AA >60 >60 ml/min/1.73m2    GFR est non-AA >60 >60 ml/min/1.73m2    Calcium 7.8 (L) 8.5 - 10.1 MG/DL    Bilirubin, total 0.8 0.2 - 1.0 MG/DL    ALT (SGPT) 5 (L) 13 - 56 U/L    AST (SGOT) 24 15 - 37 U/L    Alk. phosphatase 145 (H) 45 - 117 U/L    Protein, total 5.1 (L) 6.4 - 8.2 g/dL    Albumin 1.9 (L) 3.4 - 5.0 g/dL    Globulin 3.2 2.0 - 4.0 g/dL    A-G Ratio 0.6 (L) 0.8 - 1.7     C REACTIVE PROTEIN, QT    Collection Time: 05/14/17 10:45 AM   Result Value Ref Range    C-Reactive protein 9.6 (H) 0 - 0.3 mg/dL   SED RATE (ESR)    Collection Time: 05/14/17 10:45 AM   Result Value Ref Range    Sed rate, automated 44 (H) 0 - 20 mm/hr   POC G3    Collection Time: 05/14/17 10:45 AM   Result Value Ref Range    Device: NASAL CANNULA      Flow rate (POC) 6 L/M    FIO2 (POC) 44 %    pH (POC) 7.428 7.35 - 7.45      pCO2 (POC) 28.8 (LL) 35 - 45 MMHG    pO2 (POC) 198 (H) 80 - 100 MMHG    HCO3 (POC) 18.6 (L) 22 - 26 MMOL/L    sO2 (POC) 100 (H) 92 - 97 %    Base deficit (POC) 5 mmol/L    Allens test (POC) YES      Site RIGHT RADIAL      Patient temp.  102.9      Specimen type (POC) ARTERIAL      Performed by Moriah Acosta    EKG, 12 LEAD, INITIAL    Collection Time: 05/14/17 10:46 AM   Result Value Ref Range    Ventricular Rate 138 BPM    Atrial Rate 138 BPM    P-R Interval 128 ms    QRS Duration 74 ms    Q-T Interval 270 ms    QTC Calculation (Bezet) 409 ms    Calculated P Axis 28 degrees    Calculated R Axis 53 degrees    Diagnosis       Sinus tachycardia  T wave abnormality, consider inferior ischemia  Abnormal ECG  No previous ECGs available     LACTIC ACID, PLASMA    Collection Time: 05/14/17 11:15 AM   Result Value Ref Range    Lactic acid 2.1 (HH) 0.4 - 2. 0 MMOL/L   LACTIC ACID, PLASMA    Collection Time: 05/14/17  3:22 PM   Result Value Ref Range    Lactic acid 2.5 (HH) 0.4 - 2.0 MMOL/L   CULTURE, URINE    Collection Time: 05/14/17  9:26 PM   Result Value Ref Range    Special Requests: NO SPECIAL REQUESTS      Culture result: NO GROWTH 1 DAY     URINALYSIS W/ RFLX MICROSCOPIC    Collection Time: 05/14/17  9:30 PM   Result Value Ref Range    Color LIGHT RED     Appearance CLOUDY      Specific gravity 1.025 1.005 - 1.030      pH (UA) 6.0 5.0 - 8.0      Protein 30 (A) NEG mg/dL    Glucose NEGATIVE  NEG mg/dL    Ketone NEGATIVE  NEG mg/dL    Bilirubin NEGATIVE  NEG      Blood LARGE (A) NEG      Urobilinogen 1.0 0.2 - 1.0 EU/dL    Nitrites NEGATIVE  NEG      Leukocyte Esterase MODERATE (A) NEG     URINE MICROSCOPIC ONLY    Collection Time: 05/14/17  9:30 PM   Result Value Ref Range    WBC 50 to 75 0 - 5 /hpf    RBC >100 (H) 0 - 5 /hpf    Epithelial cells 10 to 12 0 - 5 /lpf    Bacteria FEW (A) NEG /hpf   LACTIC ACID, PLASMA    Collection Time: 05/14/17 10:20 PM   Result Value Ref Range    Lactic acid 2.0 0.4 - 2.0 MMOL/L   METABOLIC PANEL, COMPREHENSIVE    Collection Time: 05/14/17 10:20 PM   Result Value Ref Range    Sodium 140 136 - 145 mmol/L    Potassium 3.0 (L) 3.5 - 5.5 mmol/L    Chloride 108 100 - 108 mmol/L    CO2 23 21 - 32 mmol/L    Anion gap 9 3.0 - 18 mmol/L    Glucose 100 (H) 74 - 99 mg/dL    BUN 7 7.0 - 18 MG/DL    Creatinine 0.95 0.6 - 1.3 MG/DL    BUN/Creatinine ratio 7 (L) 12 - 20      GFR est AA >60 >60 ml/min/1.73m2    GFR est non-AA >60 >60 ml/min/1.73m2    Calcium 7.2 (L) 8.5 - 10.1 MG/DL    Bilirubin, total 0.6 0.2 - 1.0 MG/DL    ALT (SGPT) 11 (L) 13 - 56 U/L    AST (SGOT) 21 15 - 37 U/L    Alk.  phosphatase 88 45 - 117 U/L    Protein, total 4.3 (L) 6.4 - 8.2 g/dL    Albumin 1.5 (L) 3.4 - 5.0 g/dL    Globulin 2.8 2.0 - 4.0 g/dL    A-G Ratio 0.5 (L) 0.8 - 1.7     CBC WITH AUTOMATED DIFF    Collection Time: 05/14/17 10:20 PM   Result Value Ref Range WBC 20.7 (H) 4.6 - 13.2 K/uL    RBC 3.28 (L) 4.20 - 5.30 M/uL    HGB 9.3 (L) 12.0 - 16.0 g/dL    HCT 27.5 (L) 35.0 - 45.0 %    MCV 83.8 74.0 - 97.0 FL    MCH 28.4 24.0 - 34.0 PG    MCHC 33.8 31.0 - 37.0 g/dL    RDW 14.4 11.6 - 14.5 %    PLATELET 270 335 - 061 K/uL    MPV 10.3 9.2 - 11.8 FL    NEUTROPHILS 54 42 - 75 %    BAND NEUTROPHILS 42 (H) 0 - 5 %    LYMPHOCYTES 1 (L) 20 - 51 %    MONOCYTES 0 (L) 2 - 9 %    EOSINOPHILS 0 0 - 5 %    BASOPHILS 0 0 - 3 %    METAMYELOCYTES 3 (H) 0 %    ABS. NEUTROPHILS 11.2 (H) 1.8 - 8.0 K/UL    ABS. LYMPHOCYTES 0.2 (L) 0.8 - 3.5 K/UL    ABS. MONOCYTES 0.0 0 - 1.0 K/UL    ABS. EOSINOPHILS 0.0 0.0 - 0.4 K/UL    ABS. BASOPHILS 0.0 0.0 - 0.1 K/UL    RBC COMMENTS NORMOCYTIC, NORMOCHROMIC      WBC COMMENTS VACUOLATED POLYS      DF MANUAL     CULTURE, BLOOD    Collection Time: 05/14/17 10:25 PM   Result Value Ref Range    Special Requests: NO SPECIAL REQUESTS      Culture result: NO GROWTH 2 DAYS     LACTIC ACID, PLASMA    Collection Time: 05/15/17  2:32 AM   Result Value Ref Range    Lactic acid 1.8 0.4 - 2.0 MMOL/L   CBC WITH AUTOMATED DIFF    Collection Time: 05/15/17  5:40 AM   Result Value Ref Range    WBC 18.7 (H) 4.6 - 13.2 K/uL    RBC 3.18 (L) 4.20 - 5.30 M/uL    HGB 9.1 (L) 12.0 - 16.0 g/dL    HCT 26.7 (L) 35.0 - 45.0 %    MCV 84.0 74.0 - 97.0 FL    MCH 28.6 24.0 - 34.0 PG    MCHC 34.1 31.0 - 37.0 g/dL    RDW 14.6 (H) 11.6 - 14.5 %    PLATELET 564 925 - 720 K/uL    MPV 10.0 9.2 - 11.8 FL    NEUTROPHILS 49 42 - 75 %    BAND NEUTROPHILS 47 (H) 0 - 5 %    LYMPHOCYTES 0 (L) 20 - 51 %    MONOCYTES 1 (L) 2 - 9 %    EOSINOPHILS 0 0 - 5 %    BASOPHILS 0 0 - 3 %    METAMYELOCYTES 3 (H) 0 %    ABS. NEUTROPHILS 9.2 (H) 1.8 - 8.0 K/UL    ABS. LYMPHOCYTES 0.0 (L) 0.8 - 3.5 K/UL    ABS. MONOCYTES 0.2 0 - 1.0 K/UL    ABS. EOSINOPHILS 0.0 0.0 - 0.4 K/UL    ABS.  BASOPHILS 0.0 0.0 - 0.1 K/UL    RBC COMMENTS NORMOCYTIC, NORMOCHROMIC      WBC COMMENTS VACUOLATED POLYS      DF MANUAL     METABOLIC PANEL, BASIC    Collection Time: 05/15/17  5:40 AM   Result Value Ref Range    Sodium 141 136 - 145 mmol/L    Potassium 3.8 3.5 - 5.5 mmol/L    Chloride 110 (H) 100 - 108 mmol/L    CO2 20 (L) 21 - 32 mmol/L    Anion gap 11 3.0 - 18 mmol/L    Glucose 126 (H) 74 - 99 mg/dL    BUN 10 7.0 - 18 MG/DL    Creatinine 0.80 0.6 - 1.3 MG/DL    BUN/Creatinine ratio 13 12 - 20      GFR est AA >60 >60 ml/min/1.73m2    GFR est non-AA >60 >60 ml/min/1.73m2    Calcium 7.6 (L) 8.5 - 10.1 MG/DL   MAGNESIUM    Collection Time: 05/15/17  9:31 AM   Result Value Ref Range    Magnesium 1.1 (L) 1.6 - 2.6 mg/dL   CBC WITH AUTOMATED DIFF    Collection Time: 05/16/17  4:10 AM   Result Value Ref Range    WBC 17.9 (H) 4.6 - 13.2 K/uL    RBC 3.25 (L) 4.20 - 5.30 M/uL    HGB 9.3 (L) 12.0 - 16.0 g/dL    HCT 27.2 (L) 35.0 - 45.0 %    MCV 83.7 74.0 - 97.0 FL    MCH 28.6 24.0 - 34.0 PG    MCHC 34.2 31.0 - 37.0 g/dL    RDW 14.7 (H) 11.6 - 14.5 %    PLATELET 001 854 - 528 K/uL    MPV 10.6 9.2 - 11.8 FL    NEUTROPHILS 52 42 - 75 %    BAND NEUTROPHILS 33 (H) 0 - 5 %    LYMPHOCYTES 9 (L) 20 - 51 %    MONOCYTES 1 (L) 2 - 9 %    EOSINOPHILS 4 0 - 5 %    BASOPHILS 0 0 - 3 %    METAMYELOCYTES 1 (H) 0 %    ABS. NEUTROPHILS 9.3 (H) 1.8 - 8.0 K/UL    ABS. LYMPHOCYTES 1.6 0.8 - 3.5 K/UL    ABS. MONOCYTES 0.2 0 - 1.0 K/UL    ABS. EOSINOPHILS 0.7 (H) 0.0 - 0.4 K/UL    ABS.  BASOPHILS 0.0 0.0 - 0.1 K/UL    PLATELET COMMENTS FEW LARGE PLATELETS     RBC COMMENTS NORMOCYTIC, NORMOCHROMIC      WBC COMMENTS TOXIC GRANULATION      DF MANUAL     METABOLIC PANEL, BASIC    Collection Time: 05/16/17  4:10 AM   Result Value Ref Range    Sodium 140 136 - 145 mmol/L    Potassium 3.8 3.5 - 5.5 mmol/L    Chloride 110 (H) 100 - 108 mmol/L    CO2 23 21 - 32 mmol/L    Anion gap 7 3.0 - 18 mmol/L    Glucose 88 74 - 99 mg/dL    BUN 13 7.0 - 18 MG/DL    Creatinine 0.88 0.6 - 1.3 MG/DL    BUN/Creatinine ratio 15 12 - 20      GFR est AA >60 >60 ml/min/1.73m2    GFR est non-AA >60 >60 ml/min/1.73m2    Calcium 7.7 (L) 8.5 - 10.1 MG/DL   MAGNESIUM    Collection Time: 05/16/17  4:10 AM   Result Value Ref Range    Magnesium 1.7 1.6 - 2.6 mg/dL        Assessment:     Status post: Doing well postpartum vaginal delivery     Plan:     Postpartum care discussed including diet, ambulation, and actvitiy restrictions. Transfer back to floor  Discharge instructions and questions answered.        Signed By: Nalini Herrera MD     May 16, 2017

## 2017-05-16 NOTE — PROGRESS NOTES
0930--OB RN performed fundus check. See flowsheet. 1154--Patient medicated with 800mg PO Motrin for pain rated 6/10 in lower back. 1225--Patient c/o pain and burning to R Wrist IV site. Site was removed and new IVL 22g was placed to [address]     1242--PAIN REASSESSMENT: 6/10. Patient was medicated with 1 tab Percocet 10-325mg. Will also need to clarify order to 1tab OR 1-2 tabs of 5-325mg. 1342--PAIN REASSESSMENT: 0/10. Patient is sleeping. 1630--Received telephone orders patient may transfer to Tele per Dr. Bernetta Landau.     1930--Bedside and Verbal shift change report given to Angella Martinez RN (oncoming nurse) by Oskar Lua RN (offgoing nurse).  Report included the following information SBAR, Kardex, Intake/Output, MAR, Recent Results, Med Rec Status and Cardiac Rhythm SR-ST.

## 2017-05-16 NOTE — ROUTINE PROCESS
Bedside and Verbal shift change report given to Caitlin Allison RN (oncoming nurse) by Joelle Acuna RN (offgoing nurse).  Report included the following information SBAR, Kardex, Intake/Output, MAR, Recent Results, Med Rec Status and Cardiac Rhythm SR-ST.

## 2017-05-17 LAB
ANION GAP BLD CALC-SCNC: 9 MMOL/L (ref 3–18)
ATRIAL RATE: 138 BPM
BASOPHILS # BLD AUTO: 0 K/UL (ref 0–0.1)
BASOPHILS # BLD: 0 % (ref 0–3)
BUN SERPL-MCNC: 10 MG/DL (ref 7–18)
BUN/CREAT SERPL: 11 (ref 12–20)
CALCIUM SERPL-MCNC: 8.2 MG/DL (ref 8.5–10.1)
CALCULATED P AXIS, ECG09: 28 DEGREES
CALCULATED R AXIS, ECG10: 53 DEGREES
CHLORIDE SERPL-SCNC: 110 MMOL/L (ref 100–108)
CO2 SERPL-SCNC: 22 MMOL/L (ref 21–32)
CREAT SERPL-MCNC: 0.88 MG/DL (ref 0.6–1.3)
DIAGNOSIS, 93000: NORMAL
DIFFERENTIAL METHOD BLD: ABNORMAL
EOSINOPHIL # BLD: 0.3 K/UL (ref 0–0.4)
EOSINOPHIL NFR BLD: 2 % (ref 0–5)
ERYTHROCYTE [DISTWIDTH] IN BLOOD BY AUTOMATED COUNT: 14.8 % (ref 11.6–14.5)
GLUCOSE SERPL-MCNC: 85 MG/DL (ref 74–99)
HCT VFR BLD AUTO: 27.7 % (ref 35–45)
HGB BLD-MCNC: 9.4 G/DL (ref 12–16)
LYMPHOCYTES # BLD AUTO: 9 % (ref 20–51)
LYMPHOCYTES # BLD: 1.4 K/UL (ref 0.8–3.5)
MAGNESIUM SERPL-MCNC: 1.7 MG/DL (ref 1.6–2.6)
MCH RBC QN AUTO: 28.2 PG (ref 24–34)
MCHC RBC AUTO-ENTMCNC: 33.9 G/DL (ref 31–37)
MCV RBC AUTO: 83.2 FL (ref 74–97)
MONOCYTES # BLD: 0.5 K/UL (ref 0–1)
MONOCYTES NFR BLD AUTO: 3 % (ref 2–9)
NEUTS BAND NFR BLD MANUAL: 7 % (ref 0–5)
NEUTS SEG # BLD: 12.6 K/UL (ref 1.8–8)
NEUTS SEG NFR BLD AUTO: 79 % (ref 42–75)
P-R INTERVAL, ECG05: 128 MS
PLATELET # BLD AUTO: 209 K/UL (ref 135–420)
PMV BLD AUTO: 10.5 FL (ref 9.2–11.8)
POTASSIUM SERPL-SCNC: 4 MMOL/L (ref 3.5–5.5)
Q-T INTERVAL, ECG07: 270 MS
QRS DURATION, ECG06: 74 MS
QTC CALCULATION (BEZET), ECG08: 409 MS
RBC # BLD AUTO: 3.33 M/UL (ref 4.2–5.3)
RBC MORPH BLD: ABNORMAL
RBC MORPH BLD: ABNORMAL
SODIUM SERPL-SCNC: 141 MMOL/L (ref 136–145)
VENTRICULAR RATE, ECG03: 138 BPM
WBC # BLD AUTO: 15.9 K/UL (ref 4.6–13.2)

## 2017-05-17 PROCEDURE — 80048 BASIC METABOLIC PNL TOTAL CA: CPT | Performed by: INTERNAL MEDICINE

## 2017-05-17 PROCEDURE — 74011250637 HC RX REV CODE- 250/637: Performed by: INTERNAL MEDICINE

## 2017-05-17 PROCEDURE — 36415 COLL VENOUS BLD VENIPUNCTURE: CPT | Performed by: INTERNAL MEDICINE

## 2017-05-17 PROCEDURE — 74011250636 HC RX REV CODE- 250/636: Performed by: FAMILY MEDICINE

## 2017-05-17 PROCEDURE — 85025 COMPLETE CBC W/AUTO DIFF WBC: CPT | Performed by: INTERNAL MEDICINE

## 2017-05-17 PROCEDURE — 74011250637 HC RX REV CODE- 250/637: Performed by: OBSTETRICS & GYNECOLOGY

## 2017-05-17 PROCEDURE — 83735 ASSAY OF MAGNESIUM: CPT | Performed by: HOSPITALIST

## 2017-05-17 PROCEDURE — 65270000029 HC RM PRIVATE

## 2017-05-17 PROCEDURE — 74011250636 HC RX REV CODE- 250/636: Performed by: HOSPITALIST

## 2017-05-17 PROCEDURE — 74011250636 HC RX REV CODE- 250/636: Performed by: INTERNAL MEDICINE

## 2017-05-17 PROCEDURE — 74011000258 HC RX REV CODE- 258: Performed by: FAMILY MEDICINE

## 2017-05-17 RX ORDER — LEVOFLOXACIN 500 MG/1
500 TABLET, FILM COATED ORAL DAILY
Qty: 5 TAB | Refills: 0 | Status: SHIPPED | OUTPATIENT
Start: 2017-05-17 | End: 2017-05-18

## 2017-05-17 RX ORDER — AMOXICILLIN AND CLAVULANATE POTASSIUM 875; 125 MG/1; MG/1
1 TABLET, FILM COATED ORAL 2 TIMES DAILY
Qty: 10 TAB | Refills: 0 | Status: SHIPPED | OUTPATIENT
Start: 2017-05-17 | End: 2017-05-18

## 2017-05-17 RX ORDER — SAME BUTANEDISULFONATE/BETAINE 400-600 MG
500 POWDER IN PACKET (EA) ORAL 2 TIMES DAILY
Qty: 28 CAP | Refills: 0 | Status: SHIPPED | OUTPATIENT
Start: 2017-05-17 | End: 2017-05-24

## 2017-05-17 RX ORDER — MAGNESIUM SULFATE HEPTAHYDRATE 40 MG/ML
2 INJECTION, SOLUTION INTRAVENOUS ONCE
Status: COMPLETED | OUTPATIENT
Start: 2017-05-17 | End: 2017-05-17

## 2017-05-17 RX ADMIN — METOCLOPRAMIDE 10 MG: 10 TABLET ORAL at 08:03

## 2017-05-17 RX ADMIN — HEPARIN SODIUM 5000 UNITS: 5000 INJECTION, SOLUTION INTRAVENOUS; SUBCUTANEOUS at 20:56

## 2017-05-17 RX ADMIN — Medication 1 TABLET: at 09:15

## 2017-05-17 RX ADMIN — MAGNESIUM SULFATE HEPTAHYDRATE 2 G: 40 INJECTION, SOLUTION INTRAVENOUS at 15:32

## 2017-05-17 RX ADMIN — PIPERACILLIN SODIUM,TAZOBACTAM SODIUM 3.38 G: 3; .375 INJECTION, POWDER, FOR SOLUTION INTRAVENOUS at 12:33

## 2017-05-17 RX ADMIN — METOCLOPRAMIDE 10 MG: 10 TABLET ORAL at 12:33

## 2017-05-17 RX ADMIN — HEPARIN SODIUM 5000 UNITS: 5000 INJECTION, SOLUTION INTRAVENOUS; SUBCUTANEOUS at 12:33

## 2017-05-17 RX ADMIN — HEPARIN SODIUM 5000 UNITS: 5000 INJECTION, SOLUTION INTRAVENOUS; SUBCUTANEOUS at 03:51

## 2017-05-17 RX ADMIN — OXYCODONE HYDROCHLORIDE AND ACETAMINOPHEN 2 TABLET: 5; 325 TABLET ORAL at 00:28

## 2017-05-17 RX ADMIN — Medication 400 MG: at 09:15

## 2017-05-17 RX ADMIN — OXYCODONE HYDROCHLORIDE AND ACETAMINOPHEN 2 TABLET: 5; 325 TABLET ORAL at 21:06

## 2017-05-17 RX ADMIN — OXYCODONE HYDROCHLORIDE AND ACETAMINOPHEN 2 TABLET: 5; 325 TABLET ORAL at 06:45

## 2017-05-17 RX ADMIN — METOCLOPRAMIDE 10 MG: 10 TABLET ORAL at 20:58

## 2017-05-17 RX ADMIN — PIPERACILLIN SODIUM,TAZOBACTAM SODIUM 3.38 G: 3; .375 INJECTION, POWDER, FOR SOLUTION INTRAVENOUS at 18:32

## 2017-05-17 RX ADMIN — OXYCODONE HYDROCHLORIDE AND ACETAMINOPHEN 2 TABLET: 5; 325 TABLET ORAL at 13:24

## 2017-05-17 RX ADMIN — Medication 400 MG: at 20:58

## 2017-05-17 RX ADMIN — LEVOFLOXACIN 750 MG: 5 INJECTION, SOLUTION INTRAVENOUS at 13:25

## 2017-05-17 RX ADMIN — METOCLOPRAMIDE 10 MG: 10 TABLET ORAL at 17:01

## 2017-05-17 RX ADMIN — PIPERACILLIN SODIUM,TAZOBACTAM SODIUM 3.38 G: 3; .375 INJECTION, POWDER, FOR SOLUTION INTRAVENOUS at 00:21

## 2017-05-17 RX ADMIN — PIPERACILLIN SODIUM,TAZOBACTAM SODIUM 3.38 G: 3; .375 INJECTION, POWDER, FOR SOLUTION INTRAVENOUS at 06:42

## 2017-05-17 NOTE — PROGRESS NOTES
Bedside and Verbal shift change report given to COLTEN Blanca RN (oncoming nurse) by Jamaal Gray RN   (offgoing nurse). Report included the following information SBAR, Kardex, Intake/Output, MAR and Recent Results.

## 2017-05-17 NOTE — PROGRESS NOTES
[address] [facility] with Dr. Jaime Gonzalez. She is recommending the patient stay one additional day to continue receiving IV abx.   1125 spoke with patient regarding Dr. Monica Romero recommendation. Patient is requesting to be discharged this afternoon. [phone] spoke with Dr. Tex Weiner. Dr. Tex Weiner is recommending patient be discharged tomorrow.

## 2017-05-17 NOTE — PROGRESS NOTES
Progress Note    Patient: Suzette Em MRN: [id number]  SSN: [id number]    YOB: 1985  Age: 36 y.o. Sex: female      Subjective:     Postpartum Day:4     Delivery: vaginal delivery    The patient feels well. The patient denies emotional concerns. The baby iswell. Baby is feeding via breast.  The patient is ambulating well. The patient  tolerating a normal diet. Flatus has been passed. Objective:      Patient Vitals for the past 8 hrs:   BP Temp Pulse Resp SpO2   05/17/17 0804 121/65 98.5 °F (36.9 °C) (!) 103 20 97 %     LABS: Recent Results (from the past 24 hour(s))   CBC WITH AUTOMATED DIFF    Collection Time: 05/17/17  3:20 AM   Result Value Ref Range    WBC 15.9 (H) 4.6 - 13.2 K/uL    RBC 3.33 (L) 4.20 - 5.30 M/uL    HGB 9.4 (L) 12.0 - 16.0 g/dL    HCT 27.7 (L) 35.0 - 45.0 %    MCV 83.2 74.0 - 97.0 FL    MCH 28.2 24.0 - 34.0 PG    MCHC 33.9 31.0 - 37.0 g/dL    RDW 14.8 (H) 11.6 - 14.5 %    PLATELET 326 115 - 260 K/uL    MPV 10.5 9.2 - 11.8 FL    NEUTROPHILS 79 (H) 42 - 75 %    BAND NEUTROPHILS 7 (H) 0 - 5 %    LYMPHOCYTES 9 (L) 20 - 51 %    MONOCYTES 3 2 - 9 %    EOSINOPHILS 2 0 - 5 %    BASOPHILS 0 0 - 3 %    ABS. NEUTROPHILS 12.6 (H) 1.8 - 8.0 K/UL    ABS. LYMPHOCYTES 1.4 0.8 - 3.5 K/UL    ABS. MONOCYTES 0.5 0 - 1.0 K/UL    ABS. EOSINOPHILS 0.3 0.0 - 0.4 K/UL    ABS.  BASOPHILS 0.0 0.0 - 0.1 K/UL    RBC COMMENTS OVALOCYTES  1+        RBC COMMENTS HYPOCHROMIA  1+        DF MANUAL     METABOLIC PANEL, BASIC    Collection Time: 05/17/17  3:20 AM   Result Value Ref Range    Sodium 141 136 - 145 mmol/L    Potassium 4.0 3.5 - 5.5 mmol/L    Chloride 110 (H) 100 - 108 mmol/L    CO2 22 21 - 32 mmol/L    Anion gap 9 3.0 - 18 mmol/L    Glucose 85 74 - 99 mg/dL    BUN 10 7.0 - 18 MG/DL    Creatinine 0.88 0.6 - 1.3 MG/DL    BUN/Creatinine ratio 11 (L) 12 - 20      GFR est AA >60 >60 ml/min/1.73m2    GFR est non-AA >60 >60 ml/min/1.73m2    Calcium 8.2 (L) 8.5 - 10.1 MG/DL          Lochia:  appropriate Uterine Fundus:   firm   Fundus Location:  -1   Incision:  no significant drainage   DVT Evaluation:  No evidence of DVT seen on physical exam.     Lab/Data Review: All lab results for the last 24 hours reviewed. Assessment:     Status post: Doing well postpartum vaginal delivery     Plan:continue antibiotics iv , anticipate will be changed to possible pos meds. Wbc is improved pt looks and feels well. Will d/c home when on po antibiotics. Will await int med to make this decision     Postpartum care discussed including diet, ambulation, and actvitiy restrictions. Discharge instructions and questions answered for vaginal delivery.     Signed By: Rod Michelle MD     May 17, 2017

## 2017-05-17 NOTE — ROUTINE PROCESS
Bedside and Verbal shift change report given to DENIS Cam  (oncoming nurse) by SHAYY Canales (offgoing nurse). Report included the following information SBAR, Intake/Output, MAR and Recent Results.

## 2017-05-17 NOTE — PROGRESS NOTES
Bedside shift change report given to SHAYY Cr RN (oncoming nurse) by Carlos Jimenez. Aries Cuevas RN (offgoing nurse).  Report included the following information SBAR, Procedure Summary, Intake/Output, MAR and Recent Results.

## 2017-05-17 NOTE — PROGRESS NOTES
Hospitalist Progress Note-critical care note     Patient: Leyla Garcia MRN: [id number]  CSN: [id number]    YOB: 1985  Age: 36 y.o. Sex: female    DOA: 5/12/2017 LOS:  LOS: 5 days            Chief complaint: sepsis, hypomagnesemia    Assessment/Plan         Patient Active Problem List   Diagnosis Code    Normal spontaneous vaginal delivery O80    Laceration of labial mucosa without complication G79.432A    Anemia due to acute blood loss D62    Dyspnea R06.00    Sepsis ([address] A41.9    Septic shock (HCC) A41.9, R65.21    Fever R50.9    Endometritis following delivery O86.12    Hypomagnesemia E83.42       1. Acute dyspena:   -resolved  -cta negative for PE,   -cxr: Negative portable chest.  2 Sepsis/sepitc shock  -shock resolved  -sepsis improving with improving wbc-15.9 today   - possible intraabdominal resource  -continue iv zosyn and levaquin  -cx negative   3. Hypomagnesemia   Mg replacement, will give  2 g to keep at 2     Can go home tomorrow with po abx if continue doing well. Po abx prescribed and put on the chart. Mother was at the bedside. Subjective : feel fine,want to go home   Nurse: no acute issue     Review of systems:    General: No fevers or chills. Cardiovascular: No chest pain or pressure. No palpitations. Pulmonary: No shortness of breath. Gastrointestinal: No nausea, vomiting. Vital signs/Intake and Output:  Visit Vitals    /65 (BP 1 Location: Left arm, BP Patient Position: At rest;Head of bed elevated (Comment degrees))    Pulse (!) 103    Temp 98.5 °F (36.9 °C)    Resp 20    Ht 5' 7\" (1.702 m)    Wt 80.1 kg (176 lb 9.4 oz)    SpO2 97%    Breastfeeding Yes    BMI 27.66 kg/m2     Current Shift:     Last three shifts:  05/15 1901 - 05/17 0700  In: 2220 [P.O.:240; I.V.:1980]  Out: 2270 [Urine:2250]    Physical Exam:  General: WD, WN. Alert, cooperative, no acute distress    HEENT: NC, Atraumatic. PERRLA, anicteric sclerae.   Lungs: CTA Bilaterally. No Wheezing/Rhonchi/Rales. Heart:  rrr ,  No murmur, No Rubs, No Gallops  Abdomen: Soft, Non distended, Non tender.  +Bowel sounds,   Extremities: No c/c/e  Psych:   Not anxious or agitated. Neurologic:  No acute neurological deficit. Labs: Results:       Chemistry Recent Labs      05/17/17   0320  05/16/17   0410  05/15/17   0540  05/14/17   2220   GLU  85  88  126*  100*   NA  141  140  141  140   K  4.0  3.8  3.8  3.0*   CL  110*  110*  110*  108   CO2  22  23  20*  23   BUN  10  13  10  7   CREA  0.88  0.88  0.80  0.95   CA  8.2*  7.7*  7.6*  7.2*   AGAP  9  7  11  9   BUCR  11*  15  13  7*   AP   --    --    --   88   TP   --    --    --   4.3*   ALB   --    --    --   1.5*   GLOB   --    --    --   2.8   AGRAT   --    --    --   0.5*      CBC w/Diff Recent Labs      05/17/17 0320 05/16/17   0410  05/15/17   0540   WBC  15.9*  17.9*  18.7*   RBC  3.33*  3.25*  3.18*   HGB  9.4*  9.3*  9.1*   HCT  27.7*  27.2*  26.7*   PLT  209  153  152   GRANS  79*  52  49   LYMPH  9*  9*  0*   EOS  2  4  0      Cardiac Enzymes No results for input(s): CPK, CKND1, MICHELLE in the last 72 hours. No lab exists for component: CKRMB, TROIP   Coagulation No results for input(s): PTP, INR, APTT in the last 72 hours. No lab exists for component: INREXT, INREXT    Lipid Panel No results found for: CHOL, CHOLPOCT, CHOLX, CHLST, CHOLV, A577000, HDL, LDL, NLDLCT, DLDL, LDLC, DLDLP, 338218, VLDLC, VLDL, TGL, TGLX, TRIGL, [id number], TRIGP, TGLPOCT, [id number], CHHD, CHHDX   BNP No results for input(s): BNPP in the last 72 hours.    Liver Enzymes Recent Labs      05/14/17   2220   TP  4.3*   ALB  1.5*   AP  88   SGOT  21      Thyroid Studies Lab Results   Component Value Date/Time    TSH 2.13 11/23/2009 11:05 AM        Procedures/imaging: see electronic medical records for all procedures/Xrays and details which were not copied into this note but were reviewed prior to creation of Nataly Jj MD

## 2017-05-18 ENCOUNTER — APPOINTMENT (OUTPATIENT)
Dept: ULTRASOUND IMAGING | Age: 32
End: 2017-05-18
Attending: OBSTETRICS & GYNECOLOGY
Payer: COMMERCIAL

## 2017-05-18 ENCOUNTER — APPOINTMENT (OUTPATIENT)
Dept: CT IMAGING | Age: 32
End: 2017-05-18
Attending: HOSPITALIST
Payer: COMMERCIAL

## 2017-05-18 LAB
ANION GAP BLD CALC-SCNC: 11 MMOL/L (ref 3–18)
APPEARANCE UR: CLEAR
BACTERIA URNS QL MICRO: NEGATIVE /HPF
BASOPHILS # BLD AUTO: 0 K/UL (ref 0–0.1)
BASOPHILS # BLD: 0 % (ref 0–3)
BILIRUB UR QL: NEGATIVE
BUN SERPL-MCNC: 9 MG/DL (ref 7–18)
BUN/CREAT SERPL: 11 (ref 12–20)
CALCIUM SERPL-MCNC: 7.8 MG/DL (ref 8.5–10.1)
CHLORIDE SERPL-SCNC: 110 MMOL/L (ref 100–108)
CO2 SERPL-SCNC: 21 MMOL/L (ref 21–32)
COLOR UR: YELLOW
CREAT SERPL-MCNC: 0.84 MG/DL (ref 0.6–1.3)
D DIMER PPP FEU-MCNC: 3.18 UG/ML(FEU)
DIFFERENTIAL METHOD BLD: ABNORMAL
EOSINOPHIL # BLD: 0.2 K/UL (ref 0–0.4)
EOSINOPHIL NFR BLD: 1 % (ref 0–5)
ERYTHROCYTE [DISTWIDTH] IN BLOOD BY AUTOMATED COUNT: 14.5 % (ref 11.6–14.5)
GLUCOSE SERPL-MCNC: 76 MG/DL (ref 74–99)
GLUCOSE UR STRIP.AUTO-MCNC: NEGATIVE MG/DL
HCT VFR BLD AUTO: 29 % (ref 35–45)
HGB BLD-MCNC: 9.9 G/DL (ref 12–16)
HGB UR QL STRIP: NEGATIVE
KETONES UR QL STRIP.AUTO: NEGATIVE MG/DL
LEUKOCYTE ESTERASE UR QL STRIP.AUTO: NEGATIVE
LYMPHOCYTES # BLD AUTO: 16 % (ref 20–51)
LYMPHOCYTES # BLD: 3 K/UL (ref 0.8–3.5)
MCH RBC QN AUTO: 28 PG (ref 24–34)
MCHC RBC AUTO-ENTMCNC: 34.1 G/DL (ref 31–37)
MCV RBC AUTO: 81.9 FL (ref 74–97)
METAMYELOCYTES NFR BLD MANUAL: 1 %
MONOCYTES # BLD: 0.9 K/UL (ref 0–1)
MONOCYTES NFR BLD AUTO: 5 % (ref 2–9)
NEUTS BAND NFR BLD MANUAL: 6 % (ref 0–5)
NEUTS SEG # BLD: 13.3 K/UL (ref 1.8–8)
NEUTS SEG NFR BLD AUTO: 71 % (ref 42–75)
NITRITE UR QL STRIP.AUTO: NEGATIVE
PH UR STRIP: 7.5 [PH] (ref 5–8)
PLATELET # BLD AUTO: 218 K/UL (ref 135–420)
PLATELET COMMENTS,PCOM: ABNORMAL
PMV BLD AUTO: 9.7 FL (ref 9.2–11.8)
POTASSIUM SERPL-SCNC: 3.8 MMOL/L (ref 3.5–5.5)
PROT UR STRIP-MCNC: NEGATIVE MG/DL
RBC # BLD AUTO: 3.54 M/UL (ref 4.2–5.3)
RBC #/AREA URNS HPF: 0 /HPF (ref 0–5)
RBC MORPH BLD: ABNORMAL
SODIUM SERPL-SCNC: 142 MMOL/L (ref 136–145)
SP GR UR REFRACTOMETRY: 1 (ref 1–1.03)
UROBILINOGEN UR QL STRIP.AUTO: 1 EU/DL (ref 0.2–1)
WBC # BLD AUTO: 18.7 K/UL (ref 4.6–13.2)
WBC MORPH BLD: ABNORMAL
WBC URNS QL MICRO: 0 /HPF (ref 0–5)

## 2017-05-18 PROCEDURE — 80048 BASIC METABOLIC PNL TOTAL CA: CPT | Performed by: INTERNAL MEDICINE

## 2017-05-18 PROCEDURE — 74011250637 HC RX REV CODE- 250/637: Performed by: OBSTETRICS & GYNECOLOGY

## 2017-05-18 PROCEDURE — 76856 US EXAM PELVIC COMPLETE: CPT

## 2017-05-18 PROCEDURE — 87040 BLOOD CULTURE FOR BACTERIA: CPT | Performed by: HOSPITALIST

## 2017-05-18 PROCEDURE — 74011000250 HC RX REV CODE- 250: Performed by: HOSPITALIST

## 2017-05-18 PROCEDURE — 74011636320 HC RX REV CODE- 636/320: Performed by: OBSTETRICS & GYNECOLOGY

## 2017-05-18 PROCEDURE — 85379 FIBRIN DEGRADATION QUANT: CPT | Performed by: OBSTETRICS & GYNECOLOGY

## 2017-05-18 PROCEDURE — 74177 CT ABD & PELVIS W/CONTRAST: CPT

## 2017-05-18 PROCEDURE — 81001 URINALYSIS AUTO W/SCOPE: CPT | Performed by: OBSTETRICS & GYNECOLOGY

## 2017-05-18 PROCEDURE — 74011250636 HC RX REV CODE- 250/636: Performed by: HOSPITALIST

## 2017-05-18 PROCEDURE — 74011250636 HC RX REV CODE- 250/636: Performed by: FAMILY MEDICINE

## 2017-05-18 PROCEDURE — 74011636320 HC RX REV CODE- 636/320: Performed by: HOSPITALIST

## 2017-05-18 PROCEDURE — 74011000258 HC RX REV CODE- 258: Performed by: HOSPITALIST

## 2017-05-18 PROCEDURE — 65270000029 HC RM PRIVATE

## 2017-05-18 PROCEDURE — 36415 COLL VENOUS BLD VENIPUNCTURE: CPT | Performed by: INTERNAL MEDICINE

## 2017-05-18 PROCEDURE — 74011250636 HC RX REV CODE- 250/636: Performed by: INTERNAL MEDICINE

## 2017-05-18 PROCEDURE — 85025 COMPLETE CBC W/AUTO DIFF WBC: CPT | Performed by: INTERNAL MEDICINE

## 2017-05-18 PROCEDURE — 76830 TRANSVAGINAL US NON-OB: CPT

## 2017-05-18 PROCEDURE — 77030011943

## 2017-05-18 PROCEDURE — 74011000258 HC RX REV CODE- 258: Performed by: FAMILY MEDICINE

## 2017-05-18 RX ORDER — SODIUM CHLORIDE 9 MG/ML
75 INJECTION, SOLUTION INTRAVENOUS CONTINUOUS
Status: DISCONTINUED | OUTPATIENT
Start: 2017-05-18 | End: 2017-05-19

## 2017-05-18 RX ADMIN — PIPERACILLIN SODIUM,TAZOBACTAM SODIUM 3.38 G: 3; .375 INJECTION, POWDER, FOR SOLUTION INTRAVENOUS at 17:55

## 2017-05-18 RX ADMIN — IBUPROFEN 800 MG: 400 TABLET ORAL at 07:49

## 2017-05-18 RX ADMIN — HEPARIN SODIUM 5000 UNITS: 5000 INJECTION, SOLUTION INTRAVENOUS; SUBCUTANEOUS at 05:57

## 2017-05-18 RX ADMIN — HEPARIN SODIUM 5000 UNITS: 5000 INJECTION, SOLUTION INTRAVENOUS; SUBCUTANEOUS at 11:31

## 2017-05-18 RX ADMIN — IOPAMIDOL 100 ML: 612 INJECTION, SOLUTION INTRAVENOUS at 14:30

## 2017-05-18 RX ADMIN — METOCLOPRAMIDE 10 MG: 10 TABLET ORAL at 11:31

## 2017-05-18 RX ADMIN — PIPERACILLIN SODIUM,TAZOBACTAM SODIUM 3.38 G: 3; .375 INJECTION, POWDER, FOR SOLUTION INTRAVENOUS at 12:14

## 2017-05-18 RX ADMIN — PIPERACILLIN SODIUM,TAZOBACTAM SODIUM 3.38 G: 3; .375 INJECTION, POWDER, FOR SOLUTION INTRAVENOUS at 00:38

## 2017-05-18 RX ADMIN — PIPERACILLIN SODIUM,TAZOBACTAM SODIUM 3.38 G: 3; .375 INJECTION, POWDER, FOR SOLUTION INTRAVENOUS at 06:41

## 2017-05-18 RX ADMIN — METOCLOPRAMIDE 10 MG: 10 TABLET ORAL at 20:42

## 2017-05-18 RX ADMIN — Medication 1 TABLET: at 09:17

## 2017-05-18 RX ADMIN — SODIUM CHLORIDE 75 ML/HR: 900 INJECTION, SOLUTION INTRAVENOUS at 09:18

## 2017-05-18 RX ADMIN — METOCLOPRAMIDE 10 MG: 10 TABLET ORAL at 06:42

## 2017-05-18 RX ADMIN — DIATRIZOATE MEGLUMINE AND DIATRIZOATE SODIUM 120 ML: 600; 100 SOLUTION ORAL; RECTAL at 11:31

## 2017-05-18 RX ADMIN — METOCLOPRAMIDE 10 MG: 10 TABLET ORAL at 16:54

## 2017-05-18 RX ADMIN — CLINDAMYCIN PHOSPHATE 900 MG: 150 INJECTION, SOLUTION, CONCENTRATE INTRAVENOUS at 09:17

## 2017-05-18 RX ADMIN — HEPARIN SODIUM 5000 UNITS: 5000 INJECTION, SOLUTION INTRAVENOUS; SUBCUTANEOUS at 20:32

## 2017-05-18 RX ADMIN — PIPERACILLIN SODIUM,TAZOBACTAM SODIUM 3.38 G: 3; .375 INJECTION, POWDER, FOR SOLUTION INTRAVENOUS at 23:30

## 2017-05-18 RX ADMIN — IBUPROFEN 800 MG: 400 TABLET ORAL at 16:54

## 2017-05-18 NOTE — CONSULTS
[address] [place] [address] Rd    Name:  Julien Candelario  MR#:  [id number]  :  1985  Account #:  [de-identified]  Date of Adm:  2017  Date of Consultation:  2017      REASON FOR CONSULTATION: A 70-year-old female referred by Dr. Blayne Gan for further evaluation and management of fever, leukocytosis and  sepsis. RECOMMENDATIONS  1. Sepsis: The patient developed acute onset fever, shaking chills,  abdominal discomfort and shortness of breath one day postpartum. Whether endometritis accounts for this syndrome is unclear, though  somewhat suspected. The patient denied any significant foul vaginal  discharge. The patient denied any other obvious source of infection. There is no apparent phlebitis on examination. There has been no  reported diarrhea. As such, await repeat CT imaging scheduled for today. Would consider  vaginal ultrasound imaging for better visualization of intrauterine cavity,  assuming CT imaging is nondiagnostic. There is no evidence of septic   pelvic vein thrombophlebitis. There is no diarrhea consistent with C. difficile colitis. There is no  apparent phlebitis. There is no evidence of genitourinary tract infection. As such, would continue Zosyn. We will hold clindamycin. Will check  sedimentation rate, C-reactive protein. Will check chemistry. Ultimately, this patient may benefit from outpatient parenteral  antibiotics and/or dilatation and curettage if endometritis is seen. Thank you for allowing us to participate in the care of your patient. Will  continue to follow with you. HISTORY OF PRESENT ILLNESS: The patient is a 70-year-old  female, previously well, who had normal spontaneous vaginal delivery  of a term infant girl. The patient states 2 days postpartum developing  shaking chills with rigors. The patient had high-grade fever with  cramping abdominal discomfort and shortness of breath.  The patient  underwent CT imaging to evaluate for pulmonary embolus, which was  unrevealing. The patient's course has since been complicated by  persistent leukocytosis and intermittent fever. She has been receiving  broad-spectrum antimicrobial therapy. Presently, the patient appears nontoxic. She is afebrile. She denies  acute complaints of headache, diplopia, facial pain, mouth pain, neck  pain, chest pain, cough, sputum production, abdominal pain, nausea,  vomiting, diarrhea, dysuria, urgency, frequency. She denies any  significant vaginal discharge. She describes straw-colored fluid with  occasional blood tinge. No foul odor. No complaints of myalgias,  arthralgias, or skin rash. No chronic fever, chills or sweats. A full 12-  point review of systems was performed and unless otherwise  specifically stated above, was negative. PAST MEDICAL HISTORY: No diabetes, hypertension, heart disease,  lung disease. ALLERGIES: NO ANTIBIOTICS. SOCIAL HISTORY: The patient is a 6th grade . She  denies tobacco or alcohol abuse. Denies unusual environmental  exposures, accompanied by her parents today. FAMILY HISTORY: No MRSA. REVIEW OF SYSTEMS: As above. PHYSICAL EXAMINATION  GENERAL: Awake, alert, oriented x3. VITAL SIGNS: T-max 100.8, blood pressure 143/90, heart rate 90. HEENT: Normocephalic, atraumatic. No icterus. Oral cavity clear, no  thrush. NECK: Supple. No JVD. No adenopathy. CHEST: Clear to auscultation bilaterally. No wheezes or rales. CARDIOVASCULAR: Normal S1, S2. No murmurs. ABDOMEN: Soft, slight distended, gravid, normal postpartum. No  rebound, no guarding. No masses. No hepatosplenomegaly. BACK: Normal contour. No CVA tenderness. GENITOURINARY: No inguinal adenopathy. No lesions. EXTREMITIES: Trace distal edema. No rash or petechiae. No  phlebitis. No splinter hemorrhages. LABORATORIES: Chest x-ray independently reviewed. White blood  cells 19,000, hemoglobin 9.9, platelet count 749,060. Urinalysis is  negative. Blood cultures are sterile. BUN 9, creatinine 0.8.         MD RUSSELL Ly / MLS  D:  05/18/2017   14:02  T:  05/18/2017   14:24  Job #:  [id number]

## 2017-05-18 NOTE — PROGRESS NOTES
Bedside and Verbal shift change report given to SHAYY Warner (oncoming nurse) by Adria Squires RN   (offgoing nurse). Report included the following information SBAR, Kardex, Intake/Output, MAR and Recent Results.

## 2017-05-18 NOTE — PROGRESS NOTES
CT scan: essentially normal pelvis, some lung signs no PE    US: Em cavity empty. Small amount fluid around L ovary, does not look like abscess. Appears very stable. Continue abx and heparin for now. Will discuss with Dr Stephen Roach tomorrow.      dt

## 2017-05-18 NOTE — CONSULTS
ID Consult  Dictated #[id number]    I am concerned this syndrome may represent endometritis  For repeat ct imaging today - if unrevealing - consider transvaginal us imaging  Continue zosyn for now. Thanks.   Evelyn Webb MD

## 2017-05-18 NOTE — LACTATION NOTE
Per mom, only giving bottles when she is having a test done (out of room). Stressed sypply/demand and nipple preference.

## 2017-05-18 NOTE — PROGRESS NOTES
Progress Note    Patient: Madelaine Vásquez MRN: [id number]  SSN: [id number]    YOB: 1985  Age: 36 y.o. Sex: female    ROOM:  [facility]/01      Subjective:     Postpartum Day:5    Delivery: vaginal delivery    The patient feels well, a little tired. The patient denies emotional concerns. The baby is well. Baby is feeding via breast.  The patient is ambulating well. The patient  tolerating a normal diet. Flatus has been passed. Objective:      Patient Vitals for the past 24 hrs:   BP Temp Pulse Resp SpO2   05/17/17 2353 - 98.5 °F (36.9 °C) - - -   05/17/17 2106 137/81 (!) 100.8 °F (38.2 °C) 99 18 96 %   05/17/17 1830 143/89 98.3 °F (36.8 °C) 93 20 96 %   05/17/17 1642 131/81 97.9 °F (36.6 °C) 95 20 95 %   05/17/17 0804 121/65 98.5 °F (36.9 °C) (!) 103 20 97 %     Lochia:  appropriate   Uterine Fundus:   firm   Fundus Location:  -3 no tenderness at all   Incision:      DVT Evaluation:  No evidence of DVT seen on physical exam.  Negative Madi's sign. No cords or calf tenderness. No significant calf/ankle edema. Lab/Data Review: All lab results for the last 24 hours reviewed. LABS: Recent Results (from the past 48 hour(s))   CBC WITH AUTOMATED DIFF    Collection Time: 05/17/17  3:20 AM   Result Value Ref Range    WBC 15.9 (H) 4.6 - 13.2 K/uL    RBC 3.33 (L) 4.20 - 5.30 M/uL    HGB 9.4 (L) 12.0 - 16.0 g/dL    HCT 27.7 (L) 35.0 - 45.0 %    MCV 83.2 74.0 - 97.0 FL    MCH 28.2 24.0 - 34.0 PG    MCHC 33.9 31.0 - 37.0 g/dL    RDW 14.8 (H) 11.6 - 14.5 %    PLATELET 147 852 - 604 K/uL    MPV 10.5 9.2 - 11.8 FL    NEUTROPHILS 79 (H) 42 - 75 %    BAND NEUTROPHILS 7 (H) 0 - 5 %    LYMPHOCYTES 9 (L) 20 - 51 %    MONOCYTES 3 2 - 9 %    EOSINOPHILS 2 0 - 5 %    BASOPHILS 0 0 - 3 %    ABS. NEUTROPHILS 12.6 (H) 1.8 - 8.0 K/UL    ABS. LYMPHOCYTES 1.4 0.8 - 3.5 K/UL    ABS. MONOCYTES 0.5 0 - 1.0 K/UL    ABS. EOSINOPHILS 0.3 0.0 - 0.4 K/UL    ABS.  BASOPHILS 0.0 0.0 - 0.1 K/UL    RBC COMMENTS OVALOCYTES  1+        RBC COMMENTS HYPOCHROMIA  1+        DF MANUAL     METABOLIC PANEL, BASIC    Collection Time: 05/17/17  3:20 AM   Result Value Ref Range    Sodium 141 136 - 145 mmol/L    Potassium 4.0 3.5 - 5.5 mmol/L    Chloride 110 (H) 100 - 108 mmol/L    CO2 22 21 - 32 mmol/L    Anion gap 9 3.0 - 18 mmol/L    Glucose 85 74 - 99 mg/dL    BUN 10 7.0 - 18 MG/DL    Creatinine 0.88 0.6 - 1.3 MG/DL    BUN/Creatinine ratio 11 (L) 12 - 20      GFR est AA >60 >60 ml/min/1.73m2    GFR est non-AA >60 >60 ml/min/1.73m2    Calcium 8.2 (L) 8.5 - 10.1 MG/DL   MAGNESIUM    Collection Time: 05/17/17 10:10 AM   Result Value Ref Range    Magnesium 1.7 1.6 - 2.6 mg/dL   CBC WITH AUTOMATED DIFF    Collection Time: 05/18/17  3:21 AM   Result Value Ref Range    WBC 18.7 (H) 4.6 - 13.2 K/uL    RBC 3.54 (L) 4.20 - 5.30 M/uL    HGB 9.9 (L) 12.0 - 16.0 g/dL    HCT 29.0 (L) 35.0 - 45.0 %    MCV 81.9 74.0 - 97.0 FL    MCH 28.0 24.0 - 34.0 PG    MCHC 34.1 31.0 - 37.0 g/dL    RDW 14.5 11.6 - 14.5 %    PLATELET 238 826 - 021 K/uL    MPV 9.7 9.2 - 11.8 FL    NEUTROPHILS 71 42 - 75 %    BAND NEUTROPHILS 6 (H) 0 - 5 %    LYMPHOCYTES 16 (L) 20 - 51 %    MONOCYTES 5 2 - 9 %    EOSINOPHILS 1 0 - 5 %    BASOPHILS 0 0 - 3 %    METAMYELOCYTES 1 (H) 0 %    ABS. NEUTROPHILS 13.3 (H) 1.8 - 8.0 K/UL    ABS. LYMPHOCYTES 3.0 0.8 - 3.5 K/UL    ABS. MONOCYTES 0.9 0 - 1.0 K/UL    ABS. EOSINOPHILS 0.2 0.0 - 0.4 K/UL    ABS.  BASOPHILS 0.0 0.0 - 0.1 K/UL    PLATELET COMMENTS FEW LARGE PLATELETS     RBC COMMENTS NORMOCYTIC, NORMOCHROMIC      WBC COMMENTS DOHLE BODIES      DF MANUAL     METABOLIC PANEL, BASIC    Collection Time: 05/18/17  3:21 AM   Result Value Ref Range    Sodium 142 136 - 145 mmol/L    Potassium 3.8 3.5 - 5.5 mmol/L    Chloride 110 (H) 100 - 108 mmol/L    CO2 21 21 - 32 mmol/L    Anion gap 11 3.0 - 18 mmol/L    Glucose 76 74 - 99 mg/dL    BUN 9 7.0 - 18 MG/DL    Creatinine 0.84 0.6 - 1.3 MG/DL    BUN/Creatinine ratio 11 (L) 12 - 20      GFR est AA >60 >60 ml/min/1.73m2 GFR est non-AA >60 >60 ml/min/1.73m2    Calcium 7.8 (L) 8.5 - 10.1 MG/DL        Assessment:     Status post: Doing well postpartum vaginal delivery     Plan:     WBC up again, no sxs currently. Will discuss with hospitalist.  Postpartum care discussed including diet, ambulation, and actvitiy restrictions. Discharge instructions and questions answered.        Signed By: Nalini Herrera MD     May 18, 2017

## 2017-05-18 NOTE — ROUTINE PROCESS
Bedside and Verbal shift change report given to ISIDRO Carmen (oncoming nurse) by YEN Almaraz RN (offgoing nurse). Report included the following information SBAR, Kardex and MAR.

## 2017-05-18 NOTE — PROGRESS NOTES
Hospitalist Progress Note-critical care note     Patient: Ras Keyes MRN: [id number]  CSN: [id number]    YOB: 1985  Age: 36 y.o. Sex: female    DOA: 5/12/2017 LOS:  LOS: 6 days            Chief complaint: sepsis, endometritis     Assessment/Plan         Patient Active Problem List   Diagnosis Code    Normal spontaneous vaginal delivery O80    Laceration of labial mucosa without complication H63.215T    Anemia due to acute blood loss D62    Dyspnea R06.00    Sepsis ([address] A41.9    Septic shock (HCC) A41.9, R65.21    Fever R50.9    Endometritis following delivery O86.12    Hypomagnesemia E83.42       1. Acute dyspena:   -resolved  -cta negative for PE,   -cxr: Negative portable chest.  2 Sepsis/sepitc shock/endometritis   -fever last night and leukocytosis , wbc up   -continue iv zosyn add clindamycin ,d/c levaquin  -will have ID on board for abx instruction-case discussed with Dr. Jessa Hendricks   -bcx and  Ct abdomen/pelvic to look for abscess vs thrombosis -case discussed with Dr. Azul Pineda   -iv ns for one day to prevent   -cx negative   3. Hypomagnesemia   Mg replacement, will give  2 g to keep at 2       Subjective : feel fine, low temp, i am fine now. No abdomen pain     Nurse: no acute issue   Case discussed with Dr. Radha Pearson about the plan. All questions have been answered. 35 total min's spent on patient care including >50% on counseling/coordinating care. Discussed the above assessments. also discussed labs, medications and hospital course    Review of systems:    General: No fevers or chills. Cardiovascular: No chest pain or pressure. No palpitations. Pulmonary: No shortness of breath. Gastrointestinal: No nausea, vomiting.      Vital signs/Intake and Output:  Visit Vitals    /84 (BP 1 Location: Left arm, BP Patient Position: Sitting;Post activity)    Pulse 94    Temp 97.9 °F (36.6 °C)    Resp 20    Ht 5' 7\" (1.702 m)    Wt 81.5 kg (179 lb 9.6 oz)    SpO2 96%    Breastfeeding Yes    BMI 28.13 kg/m2     Current Shift:     Last three shifts:       Physical Exam:  General: WD, WN. Alert, cooperative, no acute distress    HEENT: NC, Atraumatic. PERRLA, anicteric sclerae. Lungs: CTA Bilaterally. No Wheezing/Rhonchi/Rales. Heart:  rrr ,  No murmur, No Rubs, No Gallops  Abdomen: Soft, Non distended, Non tender.  +Bowel sounds,   Extremities: No c/c/e  Psych:   Not anxious or agitated. Neurologic:  No acute neurological deficit. Labs: Results:       Chemistry Recent Labs      05/18/17 0321 05/17/17   0320  05/16/17   0410   GLU  76  85  88   NA  142  141  140   K  3.8  4.0  3.8   CL  110*  110*  110*   CO2  21  22  23   BUN  9  10  13   CREA  0.84  0.88  0.88   CA  7.8*  8.2*  7.7*   AGAP  11  9  7   BUCR  11*  11*  15      CBC w/Diff Recent Labs      05/18/17 0321 05/17/17 0320 05/16/17   0410   WBC  18.7*  15.9*  17.9*   RBC  3.54*  3.33*  3.25*   HGB  9.9*  9.4*  9.3*   HCT  29.0*  27.7*  27.2*   PLT  218  209  153   GRANS  71  79*  52   LYMPH  16*  9*  9*   EOS  1  2  4      Cardiac Enzymes No results for input(s): CPK, CKND1, MICHELLE in the last 72 hours. No lab exists for component: CKRMB, TROIP   Coagulation No results for input(s): PTP, INR, APTT in the last 72 hours. No lab exists for component: INREXT, INREXT    Lipid Panel No results found for: CHOL, CHOLPOCT, CHOLX, CHLST, CHOLV, [id number], HDL, LDL, NLDLCT, DLDL, LDLC, DLDLP, [id number], VLDLC, VLDL, TGL, TGLX, TRIGL, [id number], TRIGP, TGLPOCT, [id number], CHHD, CHHDX   BNP No results for input(s): BNPP in the last 72 hours. Liver Enzymes No results for input(s): TP, ALB, TBIL, AP, SGOT, GPT in the last 72 hours.     No lab exists for component: DBIL   Thyroid Studies Lab Results   Component Value Date/Time    TSH 2.13 11/23/2009 11:05 AM        Procedures/imaging: see electronic medical records for all procedures/Xrays and details which were not copied into this note but were reviewed prior to creation of Unique Loredo MD

## 2017-05-19 VITALS
HEIGHT: 67 IN | BODY MASS INDEX: 27 KG/M2 | OXYGEN SATURATION: 96 % | SYSTOLIC BLOOD PRESSURE: 139 MMHG | DIASTOLIC BLOOD PRESSURE: 76 MMHG | HEART RATE: 89 BPM | TEMPERATURE: 98.2 F | RESPIRATION RATE: 16 BRPM | WEIGHT: 172 LBS

## 2017-05-19 LAB
ALBUMIN SERPL BCP-MCNC: 1.7 G/DL (ref 3.4–5)
ALBUMIN/GLOB SERPL: 0.5 {RATIO} (ref 0.8–1.7)
ALP SERPL-CCNC: 354 U/L (ref 45–117)
ALT SERPL-CCNC: 37 U/L (ref 13–56)
ANION GAP BLD CALC-SCNC: 7 MMOL/L (ref 3–18)
AST SERPL W P-5'-P-CCNC: 33 U/L (ref 15–37)
BASOPHILS # BLD AUTO: 0 K/UL (ref 0–0.1)
BASOPHILS # BLD: 0 % (ref 0–3)
BILIRUB SERPL-MCNC: 0.4 MG/DL (ref 0.2–1)
BUN SERPL-MCNC: 7 MG/DL (ref 7–18)
BUN/CREAT SERPL: 8 (ref 12–20)
CALCIUM SERPL-MCNC: 8.1 MG/DL (ref 8.5–10.1)
CHLORIDE SERPL-SCNC: 111 MMOL/L (ref 100–108)
CO2 SERPL-SCNC: 23 MMOL/L (ref 21–32)
CREAT SERPL-MCNC: 0.89 MG/DL (ref 0.6–1.3)
DIFFERENTIAL METHOD BLD: ABNORMAL
EOSINOPHIL # BLD: 0.3 K/UL (ref 0–0.4)
EOSINOPHIL NFR BLD: 2 % (ref 0–5)
ERYTHROCYTE [DISTWIDTH] IN BLOOD BY AUTOMATED COUNT: 14.8 % (ref 11.6–14.5)
ERYTHROCYTE [SEDIMENTATION RATE] IN BLOOD: 103 MM/HR (ref 0–20)
GLOBULIN SER CALC-MCNC: 3.7 G/DL (ref 2–4)
GLUCOSE SERPL-MCNC: 93 MG/DL (ref 74–99)
HCT VFR BLD AUTO: 28.5 % (ref 35–45)
HGB BLD-MCNC: 9.7 G/DL (ref 12–16)
LYMPHOCYTES # BLD AUTO: 17 % (ref 20–51)
LYMPHOCYTES # BLD: 2.4 K/UL (ref 0.8–3.5)
MCH RBC QN AUTO: 28 PG (ref 24–34)
MCHC RBC AUTO-ENTMCNC: 34 G/DL (ref 31–37)
MCV RBC AUTO: 82.1 FL (ref 74–97)
METAMYELOCYTES NFR BLD MANUAL: 3 %
MONOCYTES # BLD: 0.6 K/UL (ref 0–1)
MONOCYTES NFR BLD AUTO: 4 % (ref 2–9)
NEUTS BAND NFR BLD MANUAL: 5 % (ref 0–5)
NEUTS SEG # BLD: 9.6 K/UL (ref 1.8–8)
NEUTS SEG NFR BLD AUTO: 69 % (ref 42–75)
PLATELET # BLD AUTO: 219 K/UL (ref 135–420)
PLATELET COMMENTS,PCOM: ABNORMAL
PMV BLD AUTO: 9.6 FL (ref 9.2–11.8)
POTASSIUM SERPL-SCNC: 4.1 MMOL/L (ref 3.5–5.5)
PROT SERPL-MCNC: 5.4 G/DL (ref 6.4–8.2)
RBC # BLD AUTO: 3.47 M/UL (ref 4.2–5.3)
RBC MORPH BLD: ABNORMAL
SODIUM SERPL-SCNC: 141 MMOL/L (ref 136–145)
WBC # BLD AUTO: 13.9 K/UL (ref 4.6–13.2)

## 2017-05-19 PROCEDURE — 85652 RBC SED RATE AUTOMATED: CPT | Performed by: INTERNAL MEDICINE

## 2017-05-19 PROCEDURE — 74011250637 HC RX REV CODE- 250/637: Performed by: OBSTETRICS & GYNECOLOGY

## 2017-05-19 PROCEDURE — 74011250636 HC RX REV CODE- 250/636: Performed by: FAMILY MEDICINE

## 2017-05-19 PROCEDURE — 85025 COMPLETE CBC W/AUTO DIFF WBC: CPT | Performed by: INTERNAL MEDICINE

## 2017-05-19 PROCEDURE — 80053 COMPREHEN METABOLIC PANEL: CPT | Performed by: INTERNAL MEDICINE

## 2017-05-19 PROCEDURE — 74011250636 HC RX REV CODE- 250/636: Performed by: INTERNAL MEDICINE

## 2017-05-19 PROCEDURE — 36415 COLL VENOUS BLD VENIPUNCTURE: CPT | Performed by: INTERNAL MEDICINE

## 2017-05-19 PROCEDURE — 86141 C-REACTIVE PROTEIN HS: CPT | Performed by: INTERNAL MEDICINE

## 2017-05-19 PROCEDURE — 74011000258 HC RX REV CODE- 258: Performed by: FAMILY MEDICINE

## 2017-05-19 PROCEDURE — 74011250636 HC RX REV CODE- 250/636: Performed by: OBSTETRICS & GYNECOLOGY

## 2017-05-19 RX ORDER — AMOXICILLIN AND CLAVULANATE POTASSIUM 875; 125 MG/1; MG/1
1 TABLET, FILM COATED ORAL EVERY 12 HOURS
Status: DISCONTINUED | OUTPATIENT
Start: 2017-05-19 | End: 2017-05-19 | Stop reason: HOSPADM

## 2017-05-19 RX ORDER — AMOXICILLIN AND CLAVULANATE POTASSIUM 875; 125 MG/1; MG/1
1 TABLET, FILM COATED ORAL EVERY 12 HOURS
Qty: 20 TAB | Refills: 0 | Status: SHIPPED | OUTPATIENT
Start: 2017-05-19

## 2017-05-19 RX ORDER — OXYCODONE AND ACETAMINOPHEN 5; 325 MG/1; MG/1
1-2 TABLET ORAL
Qty: 30 TAB | Refills: 0 | Status: SHIPPED | OUTPATIENT
Start: 2017-05-19

## 2017-05-19 RX ORDER — IBUPROFEN 800 MG/1
800 TABLET ORAL
Qty: 60 TAB | Refills: 1 | Status: SHIPPED | OUTPATIENT
Start: 2017-05-19

## 2017-05-19 RX ORDER — ENOXAPARIN SODIUM 100 MG/ML
80 INJECTION SUBCUTANEOUS EVERY 12 HOURS
Status: DISCONTINUED | OUTPATIENT
Start: 2017-05-19 | End: 2017-05-19 | Stop reason: HOSPADM

## 2017-05-19 RX ADMIN — OXYCODONE HYDROCHLORIDE AND ACETAMINOPHEN 2 TABLET: 5; 325 TABLET ORAL at 10:30

## 2017-05-19 RX ADMIN — PIPERACILLIN SODIUM,TAZOBACTAM SODIUM 3.38 G: 3; .375 INJECTION, POWDER, FOR SOLUTION INTRAVENOUS at 06:25

## 2017-05-19 RX ADMIN — PIPERACILLIN SODIUM,TAZOBACTAM SODIUM 3.38 G: 3; .375 INJECTION, POWDER, FOR SOLUTION INTRAVENOUS at 12:41

## 2017-05-19 RX ADMIN — OXYCODONE HYDROCHLORIDE AND ACETAMINOPHEN 2 TABLET: 5; 325 TABLET ORAL at 03:42

## 2017-05-19 RX ADMIN — ENOXAPARIN SODIUM 80 MG: 80 INJECTION SUBCUTANEOUS at 16:00

## 2017-05-19 RX ADMIN — Medication 1 TABLET: at 10:02

## 2017-05-19 RX ADMIN — HEPARIN SODIUM 5000 UNITS: 5000 INJECTION, SOLUTION INTRAVENOUS; SUBCUTANEOUS at 03:45

## 2017-05-19 RX ADMIN — AMOXICILLIN AND CLAVULANATE POTASSIUM 1 TABLET: 875; 125 TABLET, FILM COATED ORAL at 14:52

## 2017-05-19 RX ADMIN — HEPARIN SODIUM 5000 UNITS: 5000 INJECTION, SOLUTION INTRAVENOUS; SUBCUTANEOUS at 12:23

## 2017-05-19 RX ADMIN — IBUPROFEN 800 MG: 400 TABLET ORAL at 13:28

## 2017-05-19 NOTE — PROGRESS NOTES
Hospitalist Progress Note-critical care note     Patient: Leighton Sanchez MRN: [id number]  CSN: [id number]    YOB: 1985  Age: 36 y.o. Sex: female    DOA: 5/12/2017 LOS:  LOS: 7 days            Chief complaint: sepsis, endometritis     Assessment/Plan         Patient Active Problem List   Diagnosis Code    Normal spontaneous vaginal delivery O80    Laceration of labial mucosa without complication G65.446U    Anemia due to acute blood loss D62    Dyspnea R06.00    Sepsis ([address] A41.9    Septic shock (HCC) A41.9, R65.21    Fever R50.9    Endometritis following delivery O86.12    Hypomagnesemia E83.42       1. Acute dyspena:   -resolved  -cta negative for PE,   -cxr: Negative portable chest.  2 Sepsis/sepitc shock/endometritis   -leukocytosis improving   -ct abdomen and pelvic reviewed and ultrasound done , no abscess noted   -suspected Septic Pelvic Thrombophlebitis : recommend lovenox 1mg/kg twice a day sub Q   Will defer to  ID for abx recommendation   -cx negative   3. Hypomagnesemia   Mg replacement, will give  2 g to keep at 2       Subjective : feel fine,     Need ID recommendation before d/c. Nurse: no acute issue     Mom was at the bedside. Review of systems:    General: No fevers or chills. Cardiovascular: No chest pain or pressure. No palpitations. Pulmonary: No shortness of breath. Gastrointestinal: No nausea, vomiting. Vital signs/Intake and Output:  Visit Vitals    /74    Pulse 85    Temp 99.4 °F (37.4 °C)    Resp 18    Ht 5' 7\" (1.702 m)    Wt 81.5 kg (179 lb 9.6 oz)    SpO2 98%    Breastfeeding Yes    BMI 28.13 kg/m2     Current Shift:     Last three shifts:       Physical Exam:  General: WD, WN. Alert, cooperative, no acute distress    HEENT: NC, Atraumatic. PERRLA, anicteric sclerae. Lungs: CTA Bilaterally. No Wheezing/Rhonchi/Rales.   Heart:  rrr ,  No murmur, No Rubs, No Gallops  Abdomen: Soft, Non distended, Non tender.  +Bowel sounds, Extremities: No c/c/e  Psych:   Not anxious or agitated. Neurologic:  No acute neurological deficit. Labs: Results:       Chemistry Recent Labs      05/19/17 0452 05/18/17 0321 05/17/17 0320   GLU  93  76  85   NA  141  142  141   K  4.1  3.8  4.0   CL  111*  110*  110*   CO2  23  21  22   BUN  7  9  10   CREA  0.89  0.84  0.88   CA  8.1*  7.8*  8.2*   AGAP  7  11  9   BUCR  8*  11*  11*   AP  354*   --    --    TP  5.4*   --    --    ALB  1.7*   --    --    GLOB  3.7   --    --    AGRAT  0.5*   --    --       CBC w/Diff Recent Labs      05/19/17 0452 05/18/17 0321 05/17/17 0320   WBC  13.9*  18.7*  15.9*   RBC  3.47*  3.54*  3.33*   HGB  9.7*  9.9*  9.4*   HCT  28.5*  29.0*  27.7*   PLT  219  218  209   GRANS  69  71  79*   LYMPH  17*  16*  9*   EOS  2  1  2      Cardiac Enzymes No results for input(s): CPK, CKND1, MICHELLE in the last 72 hours. No lab exists for component: CKRMB, TROIP   Coagulation No results for input(s): PTP, INR, APTT in the last 72 hours. No lab exists for component: INREXT, INREXT    Lipid Panel No results found for: CHOL, CHOLPOCT, CHOLX, CHLST, CHOLV, [id number], HDL, LDL, NLDLCT, DLDL, LDLC, DLDLP, [id number], VLDLC, VLDL, TGL, TGLX, TRIGL, [id number], TRIGP, TGLPOCT, [id number], CHHD, CHHDX   BNP No results for input(s): BNPP in the last 72 hours.    Liver Enzymes Recent Labs      05/19/17 0452   TP  5.4*   ALB  1.7*   AP  354*   SGOT  33      Thyroid Studies Lab Results   Component Value Date/Time    TSH 2.13 11/23/2009 11:05 AM        Procedures/imaging: see electronic medical records for all procedures/Xrays and details which were not copied into this note but were reviewed prior to creation of Maurisio Evans MD

## 2017-05-19 NOTE — PROGRESS NOTES
Zosyn ==> augmentin  Rec: 1. Postpartum fever    Improved    Leukocytosis resolving      No evidence endometritis on us exam    Ct abd unrevelaling    No distinct evidence of septic pelvic vein thrombophlebitis    No other evidence of infection      ==> ongoing anticoagulation    ==> narrow abx po augmentin x 10d    stable from ID perspective for discharge home   thanks     Subjective: \"much better\"    No n/v/d    No cough/sputum    No abd pain     No rash    PE:   Visit Vitals    /83 (BP 1 Location: Left arm, BP Patient Position: At rest)    Pulse 85    Temp 98.9 °F (37.2 °C)    Resp 18    Ht 5' 7\" (1.702 m)    Wt 78 kg (172 lb)    SpO2 97%    Breastfeeding Yes    BMI 26.94 kg/m2     Awake/ alert - oriented x 3   Heent: O/c clear. No icterus. No thrush No ulcer. Neck: Supple   Chest: CTA Bilat No exp wheeze   CV: Nl s1s2 No murmurs   Abd: Soft NTND no reboun No masses   Ext:    Tr edema No rash    Lab:   Ct abd indepedntly reviewed  Recent Results (from the past 24 hour(s))   CBC WITH AUTOMATED DIFF    Collection Time: 05/19/17  4:52 AM   Result Value Ref Range    WBC 13.9 (H) 4.6 - 13.2 K/uL    RBC 3.47 (L) 4.20 - 5.30 M/uL    HGB 9.7 (L) 12.0 - 16.0 g/dL    HCT 28.5 (L) 35.0 - 45.0 %    MCV 82.1 74.0 - 97.0 FL    MCH 28.0 24.0 - 34.0 PG    MCHC 34.0 31.0 - 37.0 g/dL    RDW 14.8 (H) 11.6 - 14.5 %    PLATELET 120 115 - 049 K/uL    MPV 9.6 9.2 - 11.8 FL    NEUTROPHILS 69 42 - 75 %    BAND NEUTROPHILS 5 0 - 5 %    LYMPHOCYTES 17 (L) 20 - 51 %    MONOCYTES 4 2 - 9 %    EOSINOPHILS 2 0 - 5 %    BASOPHILS 0 0 - 3 %    METAMYELOCYTES 3 (H) 0 %    ABS. NEUTROPHILS 9.6 (H) 1.8 - 8.0 K/UL    ABS. LYMPHOCYTES 2.4 0.8 - 3.5 K/UL    ABS. MONOCYTES 0.6 0 - 1.0 K/UL    ABS. EOSINOPHILS 0.3 0.0 - 0.4 K/UL    ABS.  BASOPHILS 0.0 0.0 - 0.1 K/UL    PLATELET COMMENTS LARGE PLATELETS      RBC COMMENTS ANISOCYTOSIS  1+        RBC COMMENTS OVALOCYTES  1+        RBC COMMENTS MICROCYTOSIS  1+        DF MANUAL     SED RATE (ESR)    Collection Time: 05/19/17  4:52 AM   Result Value Ref Range    Sed rate, automated 103 (H) 0 - 20 mm/hr   METABOLIC PANEL, COMPREHENSIVE    Collection Time: 05/19/17  4:52 AM   Result Value Ref Range    Sodium 141 136 - 145 mmol/L    Potassium 4.1 3.5 - 5.5 mmol/L    Chloride 111 (H) 100 - 108 mmol/L    CO2 23 21 - 32 mmol/L    Anion gap 7 3.0 - 18 mmol/L    Glucose 93 74 - 99 mg/dL    BUN 7 7.0 - 18 MG/DL    Creatinine 0.89 0.6 - 1.3 MG/DL    BUN/Creatinine ratio 8 (L) 12 - 20      GFR est AA >60 >60 ml/min/1.73m2    GFR est non-AA >60 >60 ml/min/1.73m2    Calcium 8.1 (L) 8.5 - 10.1 MG/DL    Bilirubin, total 0.4 0.2 - 1.0 MG/DL    ALT (SGPT) 37 13 - 56 U/L    AST (SGOT) 33 15 - 37 U/L    Alk.  phosphatase 354 (H) 45 - 117 U/L    Protein, total 5.4 (L) 6.4 - 8.2 g/dL    Albumin 1.7 (L) 3.4 - 5.0 g/dL    Globulin 3.7 2.0 - 4.0 g/dL    A-G Ratio 0.5 (L) 0.8 - 1.7         Meds:     Current Facility-Administered Medications   Medication Dose Route Frequency Provider Last Rate Last Dose    enoxaparin (LOVENOX) injection 80 mg  80 mg SubCUTAneous Q12H Melania Hernandez MD        amoxicillin-clavulanate (AUGMENTIN) 875-125 mg per tablet 1 Tab  1 Tab Oral Q12H Melania Hernandez MD   1 Tab at 05/19/17 1452    oxyCODONE-acetaminophen (PERCOCET) 5-325 mg per tablet 1-2 Tab  1-2 Tab Oral Q4H PRN Melania Hernandez MD   2 Tab at 05/19/17 1030    sodium chloride (NS) flush 5-10 mL  5-10 mL IntraVENous PRN Mary Raymond MD        piperacillin-tazobactam (ZOSYN) 3.375 g in 0.9% sodium chloride (MBP/ADV) 100 mL MBP  3.375 g IntraVENous Q6H Mary Raymond  mL/hr at 05/19/17 1241 3.375 g at 05/19/17 1241    sodium chloride (NS) flush 5-10 mL  5-10 mL IntraVENous PRN Jonathan Kimball MD        Rho D immune globulin (RHOGAM) 1,500 unit (300 mcg) injection 0.3 mg  300 mcg IntraMUSCular PRN Bryce Edge MD        measles, mumps & rubella Vacc (PF) (M-M-R II) injection 0.5 mL  0.5 mL SubCUTAneous PRN Bryce Edge MD  modified lanolin (LANSINOH) ointment   Topical PRN Ursula Alpers, MD        benzocaine-menthol (DERMOPLAST) 20-0.5 % topical aerosol 1 Spray  1 Spray Topical PRN Ursula Alpers, MD        senna-docusate (PERICOLACE) 8.6-50 mg per tablet 1 Tab  1 Tab Oral BID PRN Ursula Alpers, MD   1 Tab at 05/16/17 1124    zolpidem (AMBIEN) tablet 5 mg  5 mg Oral QHS PRN Ursula Alpers, MD        witch hazel-glycerin (TUCKS) 12.5-50 % pads 1 Pad  1 Each Topical PRN Ursula Alpers, MD        ibuprofen (MOTRIN) tablet 800 mg  800 mg Oral Q8H PRN Ursula Alpers, MD   800 mg at 05/19/17 1328    prenatal vit-calcium-iron-fa (PRENATAL PLUS with CALCIUM) tablet 1 Tab  1 Tab Oral DAILY Ursula Alpers, MD   1 Tab at 05/19/17 [address]  [phone])

## 2017-05-19 NOTE — PROGRESS NOTES
Plan of care discussed with Dr. Madie Schaumann from [facility] and Dr. Krzysztof Saez from Internal Medicine. Pelvic transvaginal ultrasound and CT both negative from 5/18    Will transition to po antibiotics in the form of Augmentin 875mg/125mg po BIDx 14 days    Will continue Lovenox 80mg subcu BID    Possible discharge tomorrow- will defer to ID.

## 2017-05-19 NOTE — DISCHARGE SUMMARY
PT. Was admitted to the hospital where she had a normal laboor and delivery but shortly after her delivery was admitted to the ICU for a sepsis work up. She was maintained on IV antibiotics and anticoagulantion and discharged home onpostpartum day #6 in stable condition on Lovenox 80 mg subcu bid for 10 days, Augmentin for 10 days BID, and appropriate NSAIDS/Percocet for pain control. PT. Will follow-up in the office in one week.

## 2017-05-19 NOTE — ROUTINE PROCESS
Bedside and Verbal shift change report given to Tammi Sharp (oncoming nurse) by SHAYY Ellington (offgoing nurse). Report included the following information SBAR, Intake/Output, MAR and Recent Results.

## 2017-05-19 NOTE — PROGRESS NOTES
S> Feels well. Voiding w/ no prob; Moving bowel w/ no difficulty. OOB ambulating no SOB  O> Afebrile. Lungs clear,          Abd soft non tender- Uterus U/2-3 and non tender. Lochia serosa- non foul smelling         Extremities: non swollen, -homans         On IV antibiotics ,Labs WBC  Dropped 18.7 >13.9; Neutro 13 > 9.6   A> Ppartum endometritis/sepsis- stable on antibiotics  P> Possible D/c- ID MD to evaluate.

## 2017-05-19 NOTE — PROGRESS NOTES
-Agree with Asael Bales evaluation- Pt. Looks and feels much better today  -Would consider possible Lovenox 80 mg IM daily for Septic Pelvic Thrombophlebitis although no clots diagnosed outwardly on imaging- may have had small clots and has been on heparin therapy since postpartum day 0.   -TMAX in 24 hours: 99.4; WBC dropping  Will defer to ID for discharge/further planning- thanks so much for helping us with this case.

## 2017-05-19 NOTE — ROUTINE PROCESS
Called [facility]) to ask about the timing of switching from Heparin (q8), given at noon, to Lovenox. I was concerned that starting Lovenox at 1600 might be too soon, but Joseph Grand assures me that it will be fine to start at that time. I also asked about started Augmentin at 1400, when Zosyn was last given at 1230. Jaylan Bone tells me that is fine.

## 2017-05-19 NOTE — ROUTINE PROCESS
Bedside shift change report given to DALILA Levy LPN (oncoming nurse) by Gracy Lefort, RN (offgoing nurse). Report included the following information SBAR, OR Summary, Procedure Summary, Intake/Output, MAR and Recent Results.

## 2017-05-19 NOTE — DISCHARGE INSTRUCTIONS
POST DELIVERY DISCHARGE INSTRUCTIONS    Name: Sarah Jarvis  YOB: 1985  Primary Diagnosis: Active Problems:    Normal spontaneous vaginal delivery (2017)      Laceration of labial mucosa without complication ()      Anemia due to acute blood loss (2017)      Dyspnea (2017)      Sepsis ([address] (2017)      Septic shock ([address] (2017)      Fever (2017)      Endometritis following delivery (2017)      Hypomagnesemia (5/15/2017)        General:     Diet/Diet Restrictions:  Eight 8-ounce glasses of fluid daily (water, juices); avoid excessive caffeine intake. Meals/snacks as desired which are high in fiber and carbohydrates and low in fat and cholesterol. Physical Activity / Restrictions / Safety:     Avoid heavy lifting, no more that 8 lbs. For 2-3 weeks. Avoid intercourse 4-6 weeks, no douching or tampon use. Check with obstetrician before starting or resuming an exercise program.         Discharge Instructions/Special Treatment/Home Care Needs:     Continue prenatal vitamins. Continue to use squirt bottle with warm water on your episiotomy after each bathroom use until bleeding stops. Call your doctor for the following:     Fever over 101 degrees by mouth. Vaginal bleeding heavier than a normal menstrual period or clot larger than a golf ball. Red streaks or increased swelling of legs, painful red streaks on your breast.  Painful urination, constipation and increased pain or swelling or discharge with your incision. If you feel extremely anxious or overwhelmed. If you have thoughts of harming yourself and/or your baby. Pain Management:     Pain Management:   Take Acetaminophen (Tylenol) or Ibuprofen (Advil, Motrin), as directed for pain. Use a warm Sitz bath 3 times daily to relieve episiotomy or hemorrhoidal discomfort. Heating pad to  incision as needed.  For hemorrhoidal discomfort, use Tucks and Anusol cream as needed and directed. Follow-Up Care: These are general instructions for a healthy lifestyle:    No smoking/ No tobacco products/ Avoid exposure to second hand smoke    Surgeon General's Warning:  Quitting smoking now greatly reduces serious risk to your health. Obesity, smoking, and sedentary lifestyle greatly increases your risk for illness    A healthy diet, regular physical exercise & weight monitoring are important for maintaining a healthy lifestyle    Recognize signs and symptoms of STROKE:    F-face looks uneven    A-arms unable to move or move unevenly    S-speech slurred or non-existent    T-time-call 911 as soon as signs and symptoms begin-DO NOT go       Back to bed or wait to see if you get better-TIME IS BRAIN. Signed By: Ana Pathak LPN                                                                                                   Date: 5/19/2017 Time: 5:11 PM    Patient armband removed and given to patient to take home.   Patient was informed of the privacy risks if armband lost or stolen

## 2017-05-20 LAB
BACTERIA SPEC CULT: NORMAL
BACTERIA SPEC CULT: NORMAL
CRP SERPL HS-MCNC: 209.6 MG/L (ref 0–3)
SERVICE CMNT-IMP: NORMAL
SERVICE CMNT-IMP: NORMAL

## 2017-05-24 LAB
BACTERIA SPEC CULT: NORMAL
BACTERIA SPEC CULT: NORMAL
SERVICE CMNT-IMP: NORMAL
SERVICE CMNT-IMP: NORMAL